# Patient Record
Sex: FEMALE | Race: WHITE | Employment: FULL TIME | URBAN - METROPOLITAN AREA
[De-identification: names, ages, dates, MRNs, and addresses within clinical notes are randomized per-mention and may not be internally consistent; named-entity substitution may affect disease eponyms.]

---

## 2017-06-06 ENCOUNTER — OFFICE VISIT (OUTPATIENT)
Dept: INTERNAL MEDICINE CLINIC | Age: 37
End: 2017-06-06

## 2017-06-06 VITALS
WEIGHT: 148 LBS | RESPIRATION RATE: 16 BRPM | BODY MASS INDEX: 25.27 KG/M2 | HEIGHT: 64 IN | TEMPERATURE: 97.3 F | HEART RATE: 71 BPM | SYSTOLIC BLOOD PRESSURE: 123 MMHG | DIASTOLIC BLOOD PRESSURE: 75 MMHG

## 2017-06-06 DIAGNOSIS — J01.00 SUBACUTE MAXILLARY SINUSITIS: Primary | ICD-10-CM

## 2017-06-06 RX ORDER — CODEINE PHOSPHATE AND GUAIFENESIN 10; 100 MG/5ML; MG/5ML
5 SOLUTION ORAL
Qty: 180 ML | Refills: 0 | Status: SHIPPED | OUTPATIENT
Start: 2017-06-06 | End: 2021-12-14 | Stop reason: ALTCHOICE

## 2017-06-06 RX ORDER — AZITHROMYCIN 250 MG/1
TABLET, FILM COATED ORAL
Qty: 6 TAB | Refills: 0 | Status: SHIPPED | OUTPATIENT
Start: 2017-06-06 | End: 2017-06-11

## 2017-06-06 NOTE — MR AVS SNAPSHOT
Visit Information Date & Time Provider Department Dept. Phone Encounter #  
 6/6/2017  7:45 AM Michelle Shaw MD [facility] and [facility] [phone] [id number] Follow-up Instructions Return if symptoms worsen or fail to improve. Follow-up and Disposition History Upcoming Health Maintenance Date Due DTaP/Tdap/Td series (1 - Tdap) 1/18/2002 PAP AKA CERVICAL CYTOLOGY 7/8/2017 INFLUENZA AGE 9 TO ADULT 8/1/2017 Allergies as of 6/6/2017  Review Complete On: 6/6/2017 By: Michelle Shaw MD  
 No Known Allergies Current Immunizations  Never Reviewed No immunizations on file. Not reviewed this visit You Were Diagnosed With   
  
 Codes Comments Subacute maxillary sinusitis    -  Primary ICD-10-CM: J01.00 ICD-9-CM: 461.0 Vitals BP Pulse Temp Resp Height(growth percentile) Weight(growth percentile) 123/75 71 97.3 °F (36.3 °C) 16 5' 4\" (1.626 m) 148 lb (67.1 kg) BMI Smoking Status 25.4 kg/m2 Never Smoker BMI and BSA Data Body Mass Index Body Surface Area  
 25.4 kg/m 2 1.74 m 2 Your Updated Medication List  
  
   
This list is accurate as of: 6/6/17 12:35 PM.  Always use your most recent med list.  
  
  
  
  
 azithromycin 250 mg tablet Commonly known as:  Alcus Trinidad Take 2 tablets today, then take 1 tablet daily EMOQUETTE 0.15-0.03 mg Tab Generic drug:  desogestrel-ethinyl estradiol Take 1 Tab by mouth daily. guaiFENesin-codeine 100-10 mg/5 mL solution Commonly known as:  ROBITUSSIN AC Take 5 mL by mouth three (3) times daily as needed for Cough. Max Daily Amount: 15 mL. ZyrTEC 10 mg Cap Generic drug:  Cetirizine Take  by mouth. Prescriptions Printed Refills  
 azithromycin (ZITHROMAX) 250 mg tablet 0 Sig: Take 2 tablets today, then take 1 tablet daily Class: Print guaiFENesin-codeine (ROBITUSSIN AC) 100-10 mg/5 mL solution 0 Sig: Take 5 mL by mouth three (3) times daily as needed for Cough. Max Daily Amount: 15 mL. Class: Print Route: Oral  
  
Follow-up Instructions Return if symptoms worsen or fail to improve. Patient Instructions Adults: For nasal congestion, cough and cold/flu symptoms I advised: - Seek medical care if symptoms become more severe or if you develop  
   chest pain, shortness of breath, confusion. 
  - Contact us if your symptoms fail to improve after 7-10 days - Rest as much as possible and stay home from work/school at least 24 hours  
               after last fever - Wash hand frequently and cough/sneeze into your sleeve to help prevent  
    infection of others - Drink plenty of fluids - Ibuprofen (Advil, Motrin) 400-800mg every 6 hours or Aleve 220 mg 1-2 pills every 8 hours for fever, headache, pain - Tylenol extra strength 500 mg every 6 hours for pain, headache, fever 
 - Nasal saline rinses 2-3 times daily for nasal congestion - Mucinex 1200 mg twice daily or Guaifenesin 400 mg every 4 hours for chest  
    congestion - Robitussin DM or Delsym for cough(suppress cough and thin mucus. ) 
 - Cepacol throat lozenges and saline gargles (1 tsp salt in 8 oz water) for sore  
    throat - Tea with honey for cough (buckwheat honey preferred) - Benadryl (diphenhydramine) 50 mg at night for nasal congestion/allergies - Pseudoephedrine 12-hour tablets twice daily for nasal and inner ear    
  -Ask your pharmacist (this is kept behind the counter) -If you have high blood pressure or heart disease, use this    
   medication with caution (ask your doctor), alternative coricidin - Afrin (oxymetazoline) nasal spray 2 sprays in each nostril twice daily for severe  
   congestion.   
   -Do not use this medication for more than 3 days as it may cause \"rebound congestion\". -If you have high blood pressure or heart disease, use this medication  
    with caution (ask your doctor) Introducing [facility] & HEALTH SERVICES! Brice Apple introduces SomaLogic patient portal. Now you can access parts of your medical record, email your doctor's office, and request medication refills online. 1. In your internet browser, go to [email or web address] 2. Click on the First Time User? Click Here link in the Sign In box. You will see the New Member Sign Up page. 3. Enter your SomaLogic Access Code exactly as it appears below. You will not need to use this code after youve completed the sign-up process. If you do not sign up before the expiration date, you must request a new code. · SomaLogic Access Code: [email or web address] Expires: 9/4/2017 12:35 PM 
 
4. Enter the last four digits of your Social Security Number (xxxx) and Date of Birth (mm/dd/yyyy) as indicated and click Submit. You will be taken to the next sign-up page. 5. Create a SomaLogic ID. This will be your SomaLogic login ID and cannot be changed, so think of one that is secure and easy to remember. 6. Create a SomaLogic password. You can change your password at any time. 7. Enter your Password Reset Question and Answer. This can be used at a later time if you forget your password. 8. Enter your e-mail address. You will receive e-mail notification when new information is available in [address]. 9. Click Sign Up. You can now view and download portions of your medical record. 10. Click the Download Summary menu link to download a portable copy of your medical information. If you have questions, please visit the Frequently Asked Questions section of the SomaLogic website. Remember, SomaLogic is NOT to be used for urgent needs. For medical emergencies, dial 911. Now available from your iPhone and Android! Please provide this summary of care documentation to your next provider. Your primary care clinician is listed as S[place]. If you have any questions after today's visit, please call [phone].

## 2017-06-06 NOTE — PATIENT INSTRUCTIONS
Adults: For nasal congestion, cough and cold/flu symptoms I advised:    - Seek medical care if symptoms become more severe or if you develop      chest pain, shortness of breath, confusion.    - Contact us if your symptoms fail to improve after 7-10 days   - Rest as much as possible and stay home from work/school at least 24 hours                  after last fever              - Wash hand frequently and cough/sneeze into your sleeve to help prevent       infection of others   - Drink plenty of fluids   - Ibuprofen (Advil, Motrin) 400-800mg every 6 hours or                  Aleve 220 mg 1-2 pills every 8 hours for fever, headache, pain   - Tylenol extra strength 500 mg every 6 hours for pain, headache, fever   - Nasal saline rinses 2-3 times daily for nasal congestion   - Mucinex 1200 mg twice daily or Guaifenesin 400 mg every 4 hours for chest       congestion              - Robitussin DM or Delsym for cough(suppress cough and thin mucus. )   - Cepacol throat lozenges and saline gargles (1 tsp salt in 8 oz water) for sore       throat   - Tea with honey for cough (buckwheat honey preferred)              - Benadryl (diphenhydramine) 50 mg at night for nasal congestion/allergies   - Pseudoephedrine 12-hour tablets twice daily for nasal and inner ear       -Ask your pharmacist (this is kept behind the counter)     -If you have high blood pressure or heart disease, use this        medication with caution (ask your doctor), alternative coricidin   - Afrin (oxymetazoline) nasal spray 2 sprays in each nostril twice daily for severe      congestion.       -Do not use this medication for more than 3 days as it may cause         \"rebound congestion\".        -If you have high blood pressure or heart disease, use this medication       with caution (ask your doctor)

## 2017-06-06 NOTE — PROGRESS NOTES
No chief complaint on file. she is a 40y.o. year old female who presents for evaluation of cough  congestion, post nasal drip and productive cough for 1 months. No fevers. no nausea and no vomiting . No pain while swallowing. Over-the-counter remedies including none   . Hx Asthma:  no  Smoker:  no  Contacts with similar infections: yes   Recent travel:no         Reviewed and agree with Nurse Note and duplicated in this note. Reviewed PmHx, RxHx, FmHx, SocHx, AllgHx and updated and dated in the chart. No family history on file. No past medical history on file. Social History     Social History    Marital status:      Spouse name: N/A    Number of children: N/A    Years of education: N/A     Social History Main Topics    Smoking status: Never Smoker    Smokeless tobacco: Never Used    Alcohol use Not on file    Drug use: Not on file    Sexual activity: Not on file     Other Topics Concern    Not on file     Social History Narrative    No narrative on file        Review of Systems - negative except as listed above      Objective: There were no vitals filed for this visit.     Physical Examination: General appearance - alert, well appearing, and in no distress  Eyes - pupils equal and reactive, extraocular eye movements intact  Ears - bilateral TM's and external ear canals normal  Nose - normal and patent, no erythema, discharge or polyps  Mouth - mucous membranes moist, pharynx normal without lesions  Neck - supple, no significant adenopathy  Chest - clear to auscultation, no wheezes, rales or rhonchi, symmetric air entry  Heart - normal rate, regular rhythm, normal S1, S2, no murmurs, rubs, clicks or gallops  Abdomen - soft, nontender, nondistended, no masses or organomegaly  Extremities - peripheral pulses normal, no pedal edema, no clubbing or cyanosis  Skin - normal coloration and turgor, no rashes, no suspicious skin lesions noted    Assessment/ Plan:   There are no diagnoses linked to this encounter. Follow-up Disposition: Not on File  Adults: For nasal congestion, cough and cold/flu symptoms I advised:    - Seek medical care if symptoms become more severe or if you develop      chest pain, shortness of breath, confusion.    - Contact us if your symptoms fail to improve after 7-10 days   - Rest as much as possible and stay home from work/school at least 24 hours                  after last fever              - Wash hand frequently and cough/sneeze into your sleeve to help prevent       infection of others   - Drink plenty of fluids   - Ibuprofen (Advil, Motrin) 400-800mg every 6 hours or                  Aleve 220 mg 1-2 pills every 8 hours for fever, headache, pain   - Tylenol extra strength 500 mg every 6 hours for pain, headache, fever   - Nasal saline rinses 2-3 times daily for nasal congestion   - Mucinex 1200 mg twice daily or Guaifenesin 400 mg every 4 hours for chest       congestion              - Robitussin DM or Delsym for cough(suppress cough and thin mucus. )   - Cepacol throat lozenges and saline gargles (1 tsp salt in 8 oz water) for sore       throat   - Tea with honey for cough (buckwheat honey preferred)              - Benadryl (diphenhydramine) 50 mg at night for nasal congestion/allergies   - Pseudoephedrine 12-hour tablets twice daily for nasal and inner ear       -Ask your pharmacist (this is kept behind the counter)     -If you have high blood pressure or heart disease, use this        medication with caution (ask your doctor), alternative coricidin   - Afrin (oxymetazoline) nasal spray 2 sprays in each nostril twice daily for severe      congestion.       -Do not use this medication for more than 3 days as it may cause         \"rebound congestion\".        -If you have high blood pressure or heart disease, use this medication       with caution (ask your doctor)         I have discussed the diagnosis with the patient and the intended plan as seen in the above orders. The patient has received an after-visit summary and questions were answered concerning future plans. Medication Side Effects and Warnings were discussed with patient: yes  Patient Labs were reviewed and or requested: yes  Patient Past Records were reviewed and or requested  yes  I have discussed the diagnosis with the patient and the intended plan as seen in the above orders. The patient has received an after-visit summary and questions were answered concerning future plans. Pt agrees to call or return to clinic and/or go to closest ER with any worsening of symptoms. This may include, but not limited to increased fever (>100.4) with NSAIDS or Tylenol, increased edema, confusion, rash, worsening of presenting symptoms. Patient was informed/counseled to:    1) Remember to stay active and/or exercise regularly (I suggest 30-45 minutes daily)   2) For reliable dietary information, go to www. EATRIGHT.org. You may wish to consider seeing the nutritionist at [facility] at #[phone] or [phone], also consider the [address]   3) I routinely suggest a complete physical exam once each year (your birth month)

## 2017-12-13 ENCOUNTER — OFFICE VISIT (OUTPATIENT)
Dept: INTERNAL MEDICINE CLINIC | Age: 37
End: 2017-12-13

## 2017-12-13 VITALS
RESPIRATION RATE: 16 BRPM | SYSTOLIC BLOOD PRESSURE: 123 MMHG | OXYGEN SATURATION: 100 % | HEART RATE: 80 BPM | DIASTOLIC BLOOD PRESSURE: 71 MMHG | HEIGHT: 64 IN | TEMPERATURE: 97.8 F | BODY MASS INDEX: 26.92 KG/M2 | WEIGHT: 157.7 LBS

## 2017-12-13 DIAGNOSIS — J06.9 VIRAL UPPER RESPIRATORY TRACT INFECTION: Primary | ICD-10-CM

## 2017-12-13 RX ORDER — AZITHROMYCIN 250 MG/1
TABLET, FILM COATED ORAL
Qty: 6 TAB | Refills: 0 | Status: SHIPPED | OUTPATIENT
Start: 2017-12-13 | End: 2017-12-18

## 2017-12-13 RX ORDER — CODEINE PHOSPHATE AND GUAIFENESIN 10; 100 MG/5ML; MG/5ML
5 SOLUTION ORAL
Qty: 180 ML | Refills: 0 | Status: SHIPPED | OUTPATIENT
Start: 2017-12-13 | End: 2021-12-14 | Stop reason: ALTCHOICE

## 2017-12-13 NOTE — MR AVS SNAPSHOT
Visit Information Date & Time Provider Department Dept. Phone Encounter #  
 12/13/2017 10:45 AM Julito Villagran MD [facility] Sports Medicine and [place] [address] [id number] Follow-up Instructions Return if symptoms worsen or fail to improve. Follow-up and Disposition History Upcoming Health Maintenance Date Due DTaP/Tdap/Td series (1 - Tdap) 1/18/2002 PAP AKA CERVICAL CYTOLOGY 7/8/2017 Allergies as of 12/13/2017  Review Complete On: 12/13/2017 By: Hosea Valera LPN No Known Allergies Current Immunizations  Never Reviewed Name Date Influenza Vaccine 10/5/2017 Not reviewed this visit You Were Diagnosed With   
  
 Codes Comments Viral upper respiratory tract infection    -  Primary ICD-10-CM: J06.9, B97.89 ICD-9-CM: 465.9 Vitals BP Pulse Temp Resp Height(growth percentile) Weight(growth percentile) 123/71 (BP 1 Location: Left arm, BP Patient Position: Sitting) 80 97.8 °F (36.6 °C) (Oral) 16 5' 4\" (1.626 m) 157 lb 11.2 oz (71.5 kg) LMP SpO2 BMI OB Status Smoking Status (LMP Unknown) 100% 27.07 kg/m2 Having regular periods Never Smoker Vitals History BMI and BSA Data Body Mass Index Body Surface Area  
 27.07 kg/m 2 1.8 m 2 Your Updated Medication List  
  
   
This list is accurate as of: 12/13/17 11:03 AM.  Always use your most recent med list.  
  
  
  
  
 azithromycin 250 mg tablet Commonly known as:  Jesus Grace Take 2 tablets today, then take 1 tablet daily EMOQUETTE 0.15-0.03 mg Tab Generic drug:  desogestrel-ethinyl estradiol Take 1 Tab by mouth daily. * guaiFENesin-codeine 100-10 mg/5 mL solution Commonly known as:  ROBITUSSIN AC Take 5 mL by mouth three (3) times daily as needed for Cough. Max Daily Amount: 15 mL. * guaiFENesin-codeine 100-10 mg/5 mL solution Commonly known as:  ROBITUSSIN AC  
 Take 5 mL by mouth three (3) times daily as needed for Cough. Max Daily Amount: 15 mL. ZyrTEC 10 mg Cap Generic drug:  Cetirizine Take  by mouth. * Notice: This list has 2 medication(s) that are the same as other medications prescribed for you. Read the directions carefully, and ask your doctor or other care provider to review them with you. Prescriptions Printed Refills  
 guaiFENesin-codeine (ROBITUSSIN AC) 100-10 mg/5 mL solution 0 Sig: Take 5 mL by mouth three (3) times daily as needed for Cough. Max Daily Amount: 15 mL. Class: Print Route: Oral  
 azithromycin (ZITHROMAX) 250 mg tablet 0 Sig: Take 2 tablets today, then take 1 tablet daily Class: Print Follow-up Instructions Return if symptoms worsen or fail to improve. Patient Instructions Upper Respiratory Infection (Cold): Care Instructions Your Care Instructions An upper respiratory infection, or URI, is an infection of the nose, sinuses, or throat. URIs are spread by coughs, sneezes, and direct contact. The common cold is the most frequent kind of URI. The flu and sinus infections are other kinds of URIs. Almost all URIs are caused by viruses. Antibiotics won't cure them. But you can treat most infections with home care. This may include drinking lots of fluids and taking over-the-counter pain medicine. You will probably feel better in 4 to 10 days. The doctor has checked you carefully, but problems can develop later. If you notice any problems or new symptoms, get medical treatment right away. Follow-up care is a key part of your treatment and safety. Be sure to make and go to all appointments, and call your doctor if you are having problems. It's also a good idea to know your test results and keep a list of the medicines you take. How can you care for yourself at home?  
· To prevent dehydration, drink plenty of fluids, enough so that your urine is light yellow or clear like water. Choose water and other caffeine-free clear liquids until you feel better. If you have kidney, heart, or liver disease and have to limit fluids, talk with your doctor before you increase the amount of fluids you drink. · Take an over-the-counter pain medicine, such as acetaminophen (Tylenol), ibuprofen (Advil, Motrin), or naproxen (Aleve). Read and follow all instructions on the label. · Before you use cough and cold medicines, check the label. These medicines may not be safe for young children or for people with certain health problems. · Be careful when taking over-the-counter cold or flu medicines and Tylenol at the same time. Many of these medicines have acetaminophen, which is Tylenol. Read the labels to make sure that you are not taking more than the recommended dose. Too much acetaminophen (Tylenol) can be harmful. · Get plenty of rest. 
· Do not smoke or allow others to smoke around you. If you need help quitting, talk to your doctor about stop-smoking programs and medicines. These can increase your chances of quitting for good. When should you call for help? Call 911 anytime you think you may need emergency care. For example, call if: 
? · You have severe trouble breathing. ?Call your doctor now or seek immediate medical care if: 
? · You seem to be getting much sicker. ? · You have new or worse trouble breathing. ? · You have a new or higher fever. ? · You have a new rash. ? Watch closely for changes in your health, and be sure to contact your doctor if: 
? · You have a new symptom, such as a sore throat, an earache, or sinus pain. ? · You cough more deeply or more often, especially if you notice more mucus or a change in the color of your mucus. ? · You do not get better as expected. Where can you learn more? Go to http://david-chito.info/.  
Enter X018 in the search box to learn more about \"Upper Respiratory Infection (Cold): Care Instructions. \" Current as of: May 12, 2017 Content Version: 11.4 © 9029-3146 [employer]. Care instructions adapted under license by [employer] (which disclaims liability or warranty for this information). If you have questions about a medical condition or this instruction, always ask your healthcare professional. [address] any warranty or liability for your use of this information. Introducing [facility] & HEALTH SERVICES! Farooq Russell introduces [employer] patient portal. Now you can access parts of your medical record, email your doctor's office, and request medication refills online. 1. In your internet browser, go to [email or web address] 2. Click on the First Time User? Click Here link in the Sign In box. You will see the New Member Sign Up page. 3. Enter your [employer] Access Code exactly as it appears below. You will not need to use this code after youve completed the sign-up process. If you do not sign up before the expiration date, you must request a new code. · [employer] Access Code: [email or web address] Expires: 3/13/2018 11:03 AM 
 
4. Enter the last four digits of your Social Security Number (xxxx) and Date of Birth (mm/dd/yyyy) as indicated and click Submit. You will be taken to the next sign-up page. 5. Create a [employer] ID. This will be your [employer] login ID and cannot be changed, so think of one that is secure and easy to remember. 6. Create a [employer] password. You can change your password at any time. 7. Enter your Password Reset Question and Answer. This can be used at a later time if you forget your password. 8. Enter your e-mail address. You will receive e-mail notification when new information is available in [address] 9. Click Sign Up. You can now view and download portions of your medical record. 10. Click the Download Summary menu link to download a portable copy of your medical information. If you have questions, please visit the Frequently Asked Questions section of the [employer] website. Remember, One[employer] is NOT to be used for urgent needs. For medical emergencies, dial 911. Now available from your iPhone and Android! Please provide this summary of care documentation to your next provider. Your primary care clinician is listed as Jack Musa. If you have any questions after today's visit, please call [phone].

## 2017-12-13 NOTE — PROGRESS NOTES
Chief Complaint   Patient presents with    Cold Symptoms     she is a 40y.o. year old female who presents for evaluation of cold symptoms  congestion and productive cough for several weeks. No fever. no nausea and no vomiting. Over-the-counter remedies including mucinex. Hx Asthma:  no  Smoker:  no  Contacts with similar infections: no   Recent travel:no   Sputum Description: scant and yellow      Reviewed and agree with Nurse Note and duplicated in this note. Reviewed PmHx, RxHx, FmHx, SocHx, AllgHx and updated and dated in the chart. No family history on file. No past medical history on file.    Social History     Social History    Marital status:      Spouse name: N/A    Number of children: N/A    Years of education: N/A     Social History Main Topics    Smoking status: Never Smoker    Smokeless tobacco: Never Used    Alcohol use None    Drug use: None    Sexual activity: Not Asked     Other Topics Concern    None     Social History Narrative        Review of Systems - negative except as listed above      Objective:     Vitals:    12/13/17 1049   BP: 123/71   Pulse: 80   Resp: 16   Temp: 97.8 °F (36.6 °C)   TempSrc: Oral   SpO2: 100%   Weight: 157 lb 11.2 oz (71.5 kg)   Height: 5' 4\" (1.626 m)       Physical Examination: General appearance - alert, well appearing, and in no distress  Eyes - pupils equal and reactive, extraocular eye movements intact  Ears - bilateral TM's and external ear canals normal  Nose - mucosal congestion and mucosal erythema  Mouth - mucous membranes moist, pharynx normal without lesions  Neck - supple, no significant adenopathy  Chest - clear to auscultation, no wheezes, rales or rhonchi, symmetric air entry  Heart - normal rate, regular rhythm, normal S1, S2, no murmurs, rubs, clicks or gallops  Abdomen - soft, nontender, nondistended, no masses or organomegaly  Neurological - alert, oriented, normal speech, no focal findings or movement disorder noted  Musculoskeletal - no joint tenderness, deformity or swelling  Extremities - peripheral pulses normal, no pedal edema, no clubbing or cyanosis  Skin - normal coloration and turgor, no rashes, no suspicious skin lesions noted    Assessment/ Plan:   Diagnoses and all orders for this visit:    1. Viral upper respiratory tract infection    Other orders  -     guaiFENesin-codeine (ROBITUSSIN AC) 100-10 mg/5 mL solution; Take 5 mL by mouth three (3) times daily as needed for Cough. Max Daily Amount: 15 mL.  -     azithromycin (ZITHROMAX) 250 mg tablet; Take 2 tablets today, then take 1 tablet daily     Follow-up Disposition:  Return if symptoms worsen or fail to improve. Adults: For nasal congestion, cough and cold/flu symptoms I advised:    - Seek medical care if symptoms become more severe or if you develop      chest pain, shortness of breath, confusion.    - Contact us if your symptoms fail to improve after 7-10 days   - Rest as much as possible and stay home from work/school at least 24 hours                  after last fever              - Wash hand frequently and cough/sneeze into your sleeve to help prevent       infection of others   - Drink plenty of fluids   - Ibuprofen (Advil, Motrin) 400-800mg every 6 hours or                  Aleve 220 mg 1-2 pills every 8 hours for fever, headache, pain   - Tylenol extra strength 500 mg every 6 hours for pain, headache, fever   - Nasal saline rinses 2-3 times daily for nasal congestion   - Mucinex 1200 mg twice daily or Guaifenesin 400 mg every 4 hours for chest       congestion              - Robitussin DM or Delsym for cough(suppress cough and thin mucus.  )   - Cepacol throat lozenges and saline gargles (1 tsp salt in 8 oz water) for sore       throat   - Tea with honey for cough (buckwheat honey preferred)              - Benadryl (diphenhydramine) 50 mg at night for nasal congestion/allergies   - Pseudoephedrine 12-hour tablets twice daily for nasal and inner ear -Ask your pharmacist (this is kept behind the counter)     -If you have high blood pressure or heart disease, use this        medication with caution (ask your doctor), alternative coricidin   - Afrin (oxymetazoline) nasal spray 2 sprays in each nostril twice daily for severe      congestion.       -Do not use this medication for more than 3 days as it may cause         \"rebound congestion\". -If you have high blood pressure or heart disease, use this medication       with caution (ask your doctor)           1) Remember to stay active and/or exercise regularly (I suggest 30-45 minutes daily)   2) For reliable dietary information, go to www. EATRIGHT.org. You may wish to consider seeing the nutritionist at [facility] [phone], also consider the [address] I have discussed the diagnosis with the patient and the intended plan as seen in the above orders. The patient has received an after-visit summary and questions were answered concerning future plans. Medication Side Effects and Warnings were discussed with patient: yes  Patient Labs were reviewed and or requested: yes  Patient Past Records were reviewed and or requested  yes  I have discussed the diagnosis with the patient and the intended plan as seen in the above orders. The patient has received an after-visit summary and questions were answered concerning future plans. Pt agrees to call or return to clinic and/or go to closest ER with any worsening of symptoms. This may include, but not limited to increased fever (>100.4) with NSAIDS or Tylenol, increased edema, confusion, rash, worsening of presenting symptoms.

## 2017-12-13 NOTE — PATIENT INSTRUCTIONS
Upper Respiratory Infection (Cold): Care Instructions  Your Care Instructions    An upper respiratory infection, or URI, is an infection of the nose, sinuses, or throat. URIs are spread by coughs, sneezes, and direct contact. The common cold is the most frequent kind of URI. The flu and sinus infections are other kinds of URIs. Almost all URIs are caused by viruses. Antibiotics won't cure them. But you can treat most infections with home care. This may include drinking lots of fluids and taking over-the-counter pain medicine. You will probably feel better in 4 to 10 days. The doctor has checked you carefully, but problems can develop later. If you notice any problems or new symptoms, get medical treatment right away. Follow-up care is a key part of your treatment and safety. Be sure to make and go to all appointments, and call your doctor if you are having problems. It's also a good idea to know your test results and keep a list of the medicines you take. How can you care for yourself at home? · To prevent dehydration, drink plenty of fluids, enough so that your urine is light yellow or clear like water. Choose water and other caffeine-free clear liquids until you feel better. If you have kidney, heart, or liver disease and have to limit fluids, talk with your doctor before you increase the amount of fluids you drink. · Take an over-the-counter pain medicine, such as acetaminophen (Tylenol), ibuprofen (Advil, Motrin), or naproxen (Aleve). Read and follow all instructions on the label. · Before you use cough and cold medicines, check the label. These medicines may not be safe for young children or for people with certain health problems. · Be careful when taking over-the-counter cold or flu medicines and Tylenol at the same time. Many of these medicines have acetaminophen, which is Tylenol. Read the labels to make sure that you are not taking more than the recommended dose.  Too much acetaminophen (Tylenol) can be harmful. · Get plenty of rest.  · Do not smoke or allow others to smoke around you. If you need help quitting, talk to your doctor about stop-smoking programs and medicines. These can increase your chances of quitting for good. When should you call for help? Call 911 anytime you think you may need emergency care. For example, call if:  ? · You have severe trouble breathing. ?Call your doctor now or seek immediate medical care if:  ? · You seem to be getting much sicker. ? · You have new or worse trouble breathing. ? · You have a new or higher fever. ? · You have a new rash. ? Watch closely for changes in your health, and be sure to contact your doctor if:  ? · You have a new symptom, such as a sore throat, an earache, or sinus pain. ? · You cough more deeply or more often, especially if you notice more mucus or a change in the color of your mucus. ? · You do not get better as expected. Where can you learn more? Go to http://david-chito.info/. Enter W757 in the search box to learn more about \"Upper Respiratory Infection (Cold): Care Instructions. \"  Current as of: May 12, 2017  Content Version: 11.4  © 8934-2686 [employer]wise[employer]. Care instructions adapted under license by [employer] (which disclaims liability or warranty for this information). If you have questions about a medical condition or this instruction, always ask your healthcare professional. [place] [address] any warranty or liability for your use of this information.

## 2018-01-04 ENCOUNTER — OFFICE VISIT (OUTPATIENT)
Dept: INTERNAL MEDICINE CLINIC | Age: 38
End: 2018-01-04

## 2018-01-04 VITALS
BODY MASS INDEX: 26.63 KG/M2 | DIASTOLIC BLOOD PRESSURE: 81 MMHG | WEIGHT: 156 LBS | HEART RATE: 49 BPM | SYSTOLIC BLOOD PRESSURE: 125 MMHG | HEIGHT: 64 IN | TEMPERATURE: 97.3 F | OXYGEN SATURATION: 97 % | RESPIRATION RATE: 16 BRPM

## 2018-01-04 DIAGNOSIS — J06.9 VIRAL UPPER RESPIRATORY TRACT INFECTION: Primary | ICD-10-CM

## 2018-01-04 RX ORDER — LEVOFLOXACIN 500 MG/1
500 TABLET, FILM COATED ORAL DAILY
Qty: 7 TAB | Refills: 0 | Status: SHIPPED | OUTPATIENT
Start: 2018-01-04 | End: 2018-01-11

## 2018-01-04 NOTE — PROGRESS NOTES
Chief Complaint   Patient presents with    Cold Symptoms     she is a 40y.o. year old female who presents for follow-up of URI  congestion and productive cough for 3 weeks. no nausea and no vomiting . No sore throat and fever. Over-the-counter remedies including None. Hx Asthma:  no  Smoker:  no  Contacts with similar infections: yes   Recent travel:no   Sputum Description: scant and green      Reviewed and agree with Nurse Note and duplicated in this note. Reviewed PmHx, RxHx, FmHx, SocHx, AllgHx and updated and dated in the chart. No family history on file. No past medical history on file.    Social History     Social History    Marital status:      Spouse name: N/A    Number of children: N/A    Years of education: N/A     Social History Main Topics    Smoking status: Never Smoker    Smokeless tobacco: Never Used    Alcohol use None    Drug use: None    Sexual activity: Not Asked     Other Topics Concern    None     Social History Narrative        Review of Systems - negative except as listed above      Objective:     Vitals:    01/04/18 1245   BP: 125/81   Pulse: (!) 49   Resp: 16   Temp: 97.3 °F (36.3 °C)   TempSrc: Oral   SpO2: 97%   Weight: 156 lb (70.8 kg)   Height: 5' 4\" (1.626 m)       Physical Examination: General appearance - alert, well appearing, and in no distress  Eyes - pupils equal and reactive, extraocular eye movements intact  Ears - bilateral TM's and external ear canals normal  Nose - normal and patent, no erythema, discharge or polyps  Mouth - mucous membranes moist, pharynx normal without lesions  Neck - supple, no significant adenopathy  Chest - clear to auscultation, no wheezes, rales or rhonchi, symmetric air entry  Heart - normal rate, regular rhythm, normal S1, S2, no murmurs, rubs, clicks or gallops  Abdomen - soft, nontender, nondistended, no masses or organomegaly  Extremities - peripheral pulses normal, no pedal edema, no clubbing or cyanosis  Skin - normal coloration and turgor, no rashes, no suspicious skin lesions noted    Assessment/ Plan:   Diagnoses and all orders for this visit:    1. Viral upper respiratory tract infection    Other orders  -     levoFLOXacin (LEVAQUIN) 500 mg tablet; Take 1 Tab by mouth daily for 7 days. Follow-up Disposition: Not on File  Adults: For nasal congestion, cough and cold/flu symptoms I advised:    - Seek medical care if symptoms become more severe or if you develop      chest pain, shortness of breath, confusion.    - Contact us if your symptoms fail to improve after 7-10 days   - Rest as much as possible and stay home from work/school at least 24 hours                  after last fever              - Wash hand frequently and cough/sneeze into your sleeve to help prevent       infection of others   - Drink plenty of fluids   - Ibuprofen (Advil, Motrin) 400-800mg every 6 hours or                  Aleve 220 mg 1-2 pills every 8 hours for fever, headache, pain   - Tylenol extra strength 500 mg every 6 hours for pain, headache, fever   - Nasal saline rinses 2-3 times daily for nasal congestion   - Mucinex 1200 mg twice daily or Guaifenesin 400 mg every 4 hours for chest       congestion              - Robitussin DM or Delsym for cough(suppress cough and thin mucus.  )   - Cepacol throat lozenges and saline gargles (1 tsp salt in 8 oz water) for sore       throat   - Tea with honey for cough (buckwheat honey preferred)              - Benadryl (diphenhydramine) 50 mg at night for nasal congestion/allergies   - Pseudoephedrine 12-hour tablets twice daily for nasal and inner ear       -Ask your pharmacist (this is kept behind the counter)     -If you have high blood pressure or heart disease, use this        medication with caution (ask your doctor), alternative coricidin   - Afrin (oxymetazoline) nasal spray 2 sprays in each nostril twice daily for severe      congestion.       -Do not use this medication for more than 3 days as it may cause         \"rebound congestion\". -If you have high blood pressure or heart disease, use this medication       with caution (ask your doctor)        1) Remember to stay active and/or exercise regularly (I suggest 30-45 minutes daily)   2) For reliable dietary information, go to www. EATRIGHT.org. You may wish to consider seeing the nutritionist at [facility] [phone], also consider the [address] I have discussed the diagnosis with the patient and the intended plan as seen in the above orders. The patient has received an after-visit summary and questions were answered concerning future plans. Medication Side Effects and Warnings were discussed with patient: yes  Patient Labs were reviewed and or requested: yes  Patient Past Records were reviewed and or requested  yes  I have discussed the diagnosis with the patient and the intended plan as seen in the above orders. The patient has received an after-visit summary and questions were answered concerning future plans. Pt agrees to call or return to clinic and/or go to closest ER with any worsening of symptoms. This may include, but not limited to increased fever (>100.4) with NSAIDS or Tylenol, increased edema, confusion, rash, worsening of presenting symptoms.

## 2018-01-04 NOTE — MR AVS SNAPSHOT
Visit Information Date & Time Provider Department Dept. Phone Encounter #  
 1/4/2018  1:15 PM George Coello MD [facility] [id number] Your Appointments 1/4/2018  1:15 PM  
Any with George Coello MD  
[address] [place] [address] and Primary Care [facility] the [facility]) Appt Note: FOLLOW UP  
 [address] [place]  
  
   
 [id number] Upcoming Health Maintenance Date Due DTaP/Tdap/Td series (1 - Tdap) 1/18/2002 PAP AKA CERVICAL CYTOLOGY 7/8/2017 Allergies as of 1/4/2018  Review Complete On: 1/4/2018 By: Lahoma Claude, LPN No Known Allergies Current Immunizations  Never Reviewed Name Date Influenza Vaccine 10/5/2017 Not reviewed this visit You Were Diagnosed With   
  
 Codes Comments Viral upper respiratory tract infection    -  Primary ICD-10-CM: J06.9, B97.89 ICD-9-CM: 465.9 Vitals BP Pulse Temp Resp Height(growth percentile) Weight(growth percentile) 125/81 (BP 1 Location: Left arm, BP Patient Position: Sitting) (!) 49 97.3 °F (36.3 °C) (Oral) 16 5' 4\" (1.626 m) 156 lb (70.8 kg) LMP SpO2 BMI OB Status Smoking Status (LMP Unknown) 97% 26.78 kg/m2 Having regular periods Never Smoker Vitals History BMI and BSA Data Body Mass Index Body Surface Area  
 26.78 kg/m 2 1.79 m 2 Preferred Pharmacy Pharmacy Name Phone [address], [address] [place] [address]  [phone] Your Updated Medication List  
  
   
This list is accurate as of: 1/4/18 12:55 PM.  Always use your most recent med list.  
  
  
  
  
 EMOQUETTE 0.15-0.03 mg Tab Generic drug:  desogestrel-ethinyl estradiol Take 1 Tab by mouth daily. * guaiFENesin-codeine 100-10 mg/5 mL solution Commonly known as:  ROBITUSSIN AC Take 5 mL by mouth three (3) times daily as needed for Cough.  Max Daily Amount: 15 mL. * guaiFENesin-codeine 100-10 mg/5 mL solution Commonly known as:  ROBITUSSIN AC Take 5 mL by mouth three (3) times daily as needed for Cough. Max Daily Amount: 15 mL. levoFLOXacin 500 mg tablet Commonly known as:  Brett Derek Take 1 Tab by mouth daily for 7 days. ZyrTEC 10 mg Cap Generic drug:  Cetirizine Take  by mouth. * Notice: This list has 2 medication(s) that are the same as other medications prescribed for you. Read the directions carefully, and ask your doctor or other care provider to review them with you. Prescriptions Sent to Pharmacy Refills  
 levoFLOXacin (LEVAQUIN) 500 mg tablet 0 Sig: Take 1 Tab by mouth daily for 7 days. Class: Normal  
 Pharmacy: [facility] DR POORNIMA HODGSON [address] [facility],[address], [facility] [address]  #: [phone] Route: Oral  
  
Introducing [facility]! Julia Rogers introduces [employer] patient portal. Now you can access parts of your medical record, email your doctor's office, and request medication refills online. 1. In your internet browser, go to [email or web address] 2. Click on the First Time User? Click Here link in the Sign In box. You will see the New Member Sign Up page. 3. Enter your [employer] Access Code exactly as it appears below. You will not need to use this code after youve completed the sign-up process. If you do not sign up before the expiration date, you must request a new code. · [employer] Access Code: [email or web address] Expires: 3/13/2018 11:03 AM 
 
4. Enter the last four digits of your Social Security Number (xxxx) and Date of Birth (mm/dd/yyyy) as indicated and click Submit. You will be taken to the next sign-up page. 5. Create a S[employer]N Airo[employer]ot ID. This will be your S[employer] A[employer]ot login ID and cannot be changed, so think of one that is secure and easy to remember. 6. Create a [employer]ot password. You can change your password at any time. 7. Enter your Password Reset Question and Answer. This can be used at a later time if you forget your password. 8. Enter your e-mail address. You will receive e-mail notification when new information is available in [address] 9. Click Sign Up. You can now view and download portions of your medical record. 10. Click the Download Summary menu link to download a portable copy of your medical information. If you have questions, please visit the Frequently Asked Questions section of the [employer] website. Remember, [employer] is NOT to be used for urgent needs. For medical emergencies, dial 911. Now available from your iPhone and Android! Please provide this summary of care documentation to your next provider. Your primary care clinician is listed as Natasha Morel. If you have any questions after today's visit, please call [phone].

## 2018-05-02 ENCOUNTER — DOCUMENTATION ONLY (OUTPATIENT)
Dept: INTERNAL MEDICINE CLINIC | Age: 38
End: 2018-05-02

## 2018-06-14 ENCOUNTER — OFFICE VISIT (OUTPATIENT)
Dept: INTERNAL MEDICINE CLINIC | Age: 38
End: 2018-06-14

## 2018-06-14 VITALS
WEIGHT: 149 LBS | HEIGHT: 64 IN | BODY MASS INDEX: 25.44 KG/M2 | HEART RATE: 72 BPM | DIASTOLIC BLOOD PRESSURE: 72 MMHG | SYSTOLIC BLOOD PRESSURE: 109 MMHG | OXYGEN SATURATION: 98 % | TEMPERATURE: 98.4 F

## 2018-06-14 DIAGNOSIS — J32.9 SINUSITIS, UNSPECIFIED CHRONICITY, UNSPECIFIED LOCATION: Primary | ICD-10-CM

## 2018-06-14 DIAGNOSIS — M54.50 ACUTE LEFT-SIDED LOW BACK PAIN WITHOUT SCIATICA: ICD-10-CM

## 2018-06-14 RX ORDER — AZITHROMYCIN 250 MG/1
TABLET, FILM COATED ORAL
Qty: 6 TAB | Refills: 0 | Status: SHIPPED | OUTPATIENT
Start: 2018-06-14 | End: 2018-06-19

## 2018-06-14 NOTE — MR AVS SNAPSHOT
[address] [place] [address]. [place] [id number] 
[phone] Patient: Forrest Rangel MRN: [id number] NSV:[id number] Visit Information Date & Time Provider Department Dept. Phone Encounter #  
 6/14/2018  4:15 PM [facility] [phone] [id number] Follow-up Instructions Return if symptoms worsen or fail to improve. Follow-up and Disposition History Upcoming Health Maintenance Date Due  
 PAP AKA CERVICAL CYTOLOGY 7/8/2017 Influenza Age 5 to Adult 8/1/2018 DTaP/Tdap/Td series (2 - Td) 3/20/2028 Allergies as of 6/14/2018  Review Complete On: 6/14/2018 By: Esther Mcnally No Known Allergies Current Immunizations  Never Reviewed Name Date Hep A Vaccine (Adult) 3/20/2018 Influenza Vaccine 10/5/2017 Tdap 3/20/2018 Not reviewed this visit You Were Diagnosed With   
  
 Codes Comments Sinusitis, unspecified chronicity, unspecified location    -  Primary ICD-10-CM: J32.9 ICD-9-CM: 473.9 Acute left-sided low back pain without sciatica     ICD-10-CM: M54.5 ICD-9-CM: 724.2 BMI 25.0-25.9,adult     ICD-10-CM: K72.49 ICD-9-CM: V85.21 Vitals BP Pulse Temp Height(growth percentile) Weight(growth percentile) SpO2  
 109/72 (BP 1 Location: Right arm, BP Patient Position: Sitting) 72 98.4 °F (36.9 °C) (Oral) 5' 4\" (1.626 m) 149 lb (67.6 kg) 98% BMI OB Status Smoking Status 25.58 kg/m2 Having regular periods Never Smoker Vitals History BMI and BSA Data Body Mass Index Body Surface Area 25.58 kg/m 2 1.75 m 2 Preferred Pharmacy Pharmacy Name Phone [address], [address] [place] [address]  [phone] Your Updated Medication List  
  
   
This list is accurate as of 6/14/18  5:32 PM.  Always use your most recent med list.  
  
  
  
  
 azithromycin 250 mg tablet Commonly known as:  Cynthia Caller Take 2 tablets today, then take 1 tablet daily EMOQUETTE 0.15-0.03 mg Tab Generic drug:  desogestrel-ethinyl estradiol Take 1 Tab by mouth daily. * guaiFENesin-codeine 100-10 mg/5 mL solution Commonly known as:  ROBITUSSIN AC Take 5 mL by mouth three (3) times daily as needed for Cough. Max Daily Amount: 15 mL. * guaiFENesin-codeine 100-10 mg/5 mL solution Commonly known as:  ROBITUSSIN AC Take 5 mL by mouth three (3) times daily as needed for Cough. Max Daily Amount: 15 mL. ZyrTEC 10 mg Cap Generic drug:  Cetirizine Take  by mouth. * Notice: This list has 2 medication(s) that are the same as other medications prescribed for you. Read the directions carefully, and ask your doctor or other care provider to review them with you. Prescriptions Sent to Pharmacy Refills  
 azithromycin (ZITHROMAX) 250 mg tablet 0 Sig: Take 2 tablets today, then take 1 tablet daily Class: Normal  
 Pharmacy: [facility] [address] POORNIMA HODGSON [address] [facility],[address], [facility] [address] #: [phone] We Performed the Following REFERRAL TO PHYSICAL THERAPY [MEZ19 Custom] Follow-up Instructions Return if symptoms worsen or fail to improve. Referral Information Referral ID Referred By Referred To  
  
 [id number] [facility]   
   [address]   
   [place], [address] Phone: [phone] Fax: [phone] Visits Status Start Date End Date  
 20 New Request 6/14/18 6/14/19 If your referral has a status of pending review or denied, additional information will be sent to support the outcome of this decision. Introducing [facility]! Dona Renae introduces [employer] patient portal. Now you can access parts of your medical record, email your doctor's office, and request medication refills online. 1. In your internet browser, go to [email or web address] 2. Click on the First Time User? Click Here link in the Sign In box. You will see the New Member Sign Up page. 3. Enter your [employer] Access Code exactly as it appears below. You will not need to use this code after youve completed the sign-up process. If you do not sign up before the expiration date, you must request a new code. · [employer] Access Code: [email or web address] Expires: 9/12/2018  4:29 PM 
 
4. Enter the last four digits of your Social Security Number (xxxx) and Date of Birth (mm/dd/yyyy) as indicated and click Submit. You will be taken to the next sign-up page. 5. Create a [employer] ID. This will be your [employer] login ID and cannot be changed, so think of one that is secure and easy to remember. 6. Create a [employer] password. You can change your password at any time. 7. Enter your Password Reset Question and Answer. This can be used at a later time if you forget your password. 8. Enter your e-mail address. You will receive e-mail notification when new information is available in [address] 9. Click Sign Up. You can now view and download portions of your medical record. 10. Click the Download Summary menu link to download a portable copy of your medical information. If you have questions, please visit the Frequently Asked Questions section of the [employer] website. Remember, [employer] is NOT to be used for urgent needs. For medical emergencies, dial 911. Now available from your iPhone and Android! Please provide this summary of care documentation to your next provider. Your primary care clinician is listed as Candace Hawthorne. If you have any questions after today's visit, please call [phone].

## 2018-06-14 NOTE — PROGRESS NOTES
Chief Complaint   Patient presents with    URI     x 2 weeks     she is a 41y.o. year old female who presents for evaluation of URI. congestion, post nasal drip and productive cough for 2 weeks. No fevers. no nausea and no vomiting . No sneezing, dry cough, headache and  sinus pain. Over-the-counter remedies including Mucinex, Seudofed, Benedryl, and Allegra   . Hx Asthma:  no  Smoker:  no  Contacts with similar infections: yes   Recent travel:yes   Sputum Description: green      Reviewed and agree with Nurse Note and duplicated in this note. Reviewed PmHx, RxHx, FmHx, SocHx, AllgHx and updated and dated in the chart. No family history on file. No past medical history on file.    Social History     Social History    Marital status:      Spouse name: N/A    Number of children: N/A    Years of education: N/A     Social History Main Topics    Smoking status: Never Smoker    Smokeless tobacco: Never Used    Alcohol use Not on file    Drug use: Not on file    Sexual activity: Not on file     Other Topics Concern    Not on file     Social History Narrative        Review of Systems - negative except as listed above      Objective:     Vitals:    06/14/18 1610   BP: 109/72   Pulse: 72   Temp: 98.4 °F (36.9 °C)   TempSrc: Oral   SpO2: 98%   Weight: 149 lb (67.6 kg)   Height: 5' 4\" (1.626 m)       Physical Examination: General appearance - alert, well appearing, and in no distress  Eyes - pupils equal and reactive, extraocular eye movements intact  Ears - bilateral TM's and external ear canals normal  Nose - normal and patent, no erythema, discharge or polyps  Mouth - mucous membranes moist, pharynx normal without lesions  Neck - supple, no significant adenopathy  Chest - clear to auscultation, no wheezes, rales or rhonchi, symmetric air entry  Heart - normal rate, regular rhythm, normal S1, S2, no murmurs, rubs, clicks or gallops  Abdomen - soft, nontender, nondistended, no masses or organomegaly  Back exam - tenderness noted left low back , negative straight-leg raise bilaterally , normal reflexes and strength bilateral lower extremities  Neurological - alert, oriented, normal speech, no focal findings or movement disorder noted  Musculoskeletal - no joint tenderness, deformity or swelling  Extremities - peripheral pulses normal, no pedal edema, no clubbing or cyanosis  Skin - normal coloration and turgor, no rashes, no suspicious skin lesions noted    Assessment/ Plan:   Diagnoses and all orders for this visit:    1. Sinusitis, unspecified chronicity, unspecified location    2. Acute left-sided low back pain without sciatica  -     REFERRAL TO PHYSICAL THERAPY  Physical therapy for manipulation of left low back  3. BMI 25.0-25.9,adult    Other orders  -     azithromycin (ZITHROMAX) 250 mg tablet; Take 2 tablets today, then take 1 tablet daily       Follow-up Disposition: Not on File  Adults: For nasal congestion, cough and cold/flu symptoms I advised:    - Seek medical care if symptoms become more severe or if you develop      chest pain, shortness of breath, confusion.    - Contact us if your symptoms fail to improve after 7-10 days   - Rest as much as possible and stay home from work/school at least 24 hours                  after last fever              - Wash hand frequently and cough/sneeze into your sleeve to help prevent       infection of others   - Drink plenty of fluids   - Ibuprofen (Advil, Motrin) 400-800mg every 6 hours or                  Aleve 220 mg 1-2 pills every 8 hours for fever, headache, pain   - Tylenol extra strength 500 mg every 6 hours for pain, headache, fever   - Nasal saline rinses 2-3 times daily for nasal congestion   - Mucinex 1200 mg twice daily or Guaifenesin 400 mg every 4 hours for chest       congestion              - Robitussin DM or Delsym for cough(suppress cough and thin mucus.  )   - Cepacol throat lozenges and saline gargles (1 tsp salt in 8 oz water) for sore       throat   - Tea with honey for cough (buckwheat honey preferred)              - Benadryl (diphenhydramine) 50 mg at night for nasal congestion/allergies   - Pseudoephedrine 12-hour tablets twice daily for nasal and inner ear       -Ask your pharmacist (this is kept behind the counter)     -If you have high blood pressure or heart disease, use this        medication with caution (ask your doctor), alternative coricidin   - Afrin (oxymetazoline) nasal spray 2 sprays in each nostril twice daily for severe      congestion.       -Do not use this medication for more than 3 days as it may cause         \"rebound congestion\". -If you have high blood pressure or heart disease, use this medication       with caution (ask your doctor)      Children:   Sit in bathroom with hot shower running for steam.    Nasal saline rinses and Neti pot  In general for viral respiratory infection -  Aim for drainage/increased airflow  Increase fluids - Pedialyte popsicles, Gatorade mixed with 50% water         1) Remember to stay active and/or exercise regularly (I suggest 30-45 minutes daily)   2) For reliable dietary information, go to www. EATRIGHT.org. You may wish to consider seeing the nutritionist at [facility] [phone], also consider the [address] I have discussed the diagnosis with the patient and the intended plan as seen in the above orders. The patient has received an after-visit summary and questions were answered concerning future plans. Medication Side Effects and Warnings were discussed with patient: yes  Patient Labs were reviewed and or requested: yes  Patient Past Records were reviewed and or requested  yes  I have discussed the diagnosis with the patient and the intended plan as seen in the above orders. The patient has received an after-visit summary and questions were answered concerning future plans.      Pt agrees to call or return to clinic and/or go to closest ER with any worsening of symptoms. This may include, but not limited to increased fever (>100.4) with NSAIDS or Tylenol, increased edema, confusion, rash, worsening of presenting symptoms.

## 2018-06-28 ENCOUNTER — HOSPITAL ENCOUNTER (OUTPATIENT)
Dept: PHYSICAL THERAPY | Age: 38
Discharge: HOME | End: 2018-06-28
Payer: COMMERCIAL

## 2018-06-28 PROCEDURE — 97161 PT EVAL LOW COMPLEX 20 MIN: CPT

## 2018-06-28 PROCEDURE — 97110 THERAPEUTIC EXERCISES: CPT

## 2018-06-28 PROCEDURE — 97140 MANUAL THERAPY 1/> REGIONS: CPT

## 2018-06-28 NOTE — PROGRESS NOTES
PT INITIAL EVALUATION NOTE 2-15    Patient Name: Collette Gemma  Date:2018  : 1980  [x]  Patient  Verified  Payor: /    In time: 11:14 am  Out time:12:08 pm  Total Treatment Time (min): 54 minutes  Visit #: 1     Treatment Area: There are no admission diagnoses documented for this encounter. SUBJECTIVE  Pain Level (0-10 scale): 1/10  Any medication changes, allergies to medications, adverse drug reactions, diagnosis change, or new procedure performed?: [] No    [x] Yes (see summary sheet for update)  Subjective:     Pt was referred to skilled PT for acute L-sided LBP without sciatica. Today, Pt reports primary complaints of intermittent throbbing L LBP that occasionally radiates to cause \"pulsating\" L hip flexor pain which lasts for 10-15 minutes. Mechanism of Injury: Initial injury occurred 2 years ago while performing a plank with R hip flexed to elbow and L hip extended up in air. Symptoms were completely resolved, but re-aggravated in 2018 during pose in yoga (half-push-up pose). Pt denies any numbness/tingling or pain in lower extremity. Aggravating factors include: prolonged standing at work, stepping to L occasionally. Relieving factors include yoga (forward folds), hip extensor stretches, foam roller on hip flexor and L-sided L low back. Patient denies any functional limitations, but the pain is more of an \"annoyance\" that she wants to get right before it gets worse. PLOF: Pole dancing competitions; yoga 5-7x/day. Previous Treatment/Compliance: NA   Work Hx: Pharmacist    Living Situation: Pt lives in a 1-story house with 3 stairs to enter. PMHx/Surgical Hx: NA.      Pt Goals: Be normal again without constant reminder of pain. OBJECTIVE    Posture:  Normal  Other Observations:  NA  Gait and Functional Mobility:  No significant abnormalities  Palpation: No tenderness, but Pt noted with finger test that normal pain is at L PSIS.   Pt reported localized tingling with palpation of L piriformis muscle only. DL Squats: Normal   L SL Squats: Genu valgus, mod decreased knee control  R SL Squats: min decreased knee control    Balance Normal  R/L Side lunges: Normal, no increase in pain          Lumbar AROM:          R      L    Flexion    100% eases pain      Extension   100% min increased pain L low back      Side Bending   100% stretching/pain in L hip flexor  100% eases the pain         Flexibility: Above-average hamstring, hip flexor flexibility bilaterally  Mobility Assessment: Normal      MMT:               HIP Ext (knee bent): R: 4+; L: 4-              HIP Abd: R: 5; L 4  Neurological: Reflexes / Sensations: NT  Special Tests:    Trendelenberg: (-)    FABERS: (-)   Forward Bend: (-)    Slump: NT   H.S. SLR: (-)     Piriformis Ext: (+) L only   Long Sit: (+) R>L    Lumbar Distraction: NT   SI Compression/Distraction: (-)   [place]'s: (+) with R hip EXT and L hip flexion    15 min Therapeutic Exercise:  [x] See flow sheet :   Rationale: increase ROM and increase strength to improve the patients ability to return to PLOF without pain. 10 min Manual Therapy:  Shot-gun technique; MET for L posteriorly rotated innominate   Rationale: decrease pain and increase tissue extensibility  to improve the patients ability to return to PLOF without pain.       With   [x] TE   [] TA   [] neuro   [x] other: Patient Education: [x] Review HEP    [] Progressed/Changed HEP based on:   [] positioning   [] body mechanics   [] transfers   [] heat/ice application    [x] other: Educated Pt regarding anatomy of SI joint, potential differential diagnoses, impairments, and POC       Other Objective/Functional Measures:  FOTO Score: 94/100    Pain Level (0-10 scale) post treatment: 0/10      ASSESSMENT:      [x]  See Plan of [address] 6/28/2018  10:54 AM

## 2018-06-28 NOTE — PROGRESS NOTES
[employer] [facility]  [address] ([place]), [address] [facility] Klarissa Crystal  Phone: [phone] Fax: [phone]    Plan of Care/Statement of Necessity for Physical Therapy Services  2-15    Patient name: Kaitlin Hernández  : 1980  Provider#: [id number]  Referral source: Kallie Carrasco MD      Medical/Treatment Diagnosis: There are no admission diagnoses documented for this encounter. Prior Hospitalization: see medical history        Comorbidities: Back pain  Prior Level of Function: Patient completed 20 minutes of exercise at least 3 times a week. Medications: Verified on Patient Summary List    Start of Care: 18      Onset Date: 2018       The Plan of Care and following information is based on the information from the initial evaluation. Assessment/ key information: Pt is a very pleasant 41year old female who reports primary complaints of intermittent throbbing L-sided LBP that occasionally wraps around to anterior L hip. Based on examination, patient presents with symptoms that are potentially indicative of SI joint involvement with several associated impairments including hip weakness bilaterally (L>R) and decreased core stability.     Evaluation Complexity History LOW Complexity : Zero comorbidities / personal factors that will impact the outcome / POC; Examination MEDIUM Complexity : 3 Standardized tests and measures addressing body structure, function, activity limitation and / or participation in recreation  ;Presentation LOW Complexity : Stable, uncomplicated  ;Clinical Decision Making LOW Complexity : FOTO score of   Overall Complexity Rating: LOW     Problem List: pain affecting function and decrease strength   Treatment Plan may include any combination of the following: Therapeutic exercise, Therapeutic activities, Neuromuscular re-education, Physical agent/modality, Manual therapy and Patient education  Patient / Family readiness to learn indicated by: asking questions, trying to perform skills and interest  Persons(s) to be included in education: patient (P)  Barriers to Learning/Limitations: None  Patient Goal (s): Stop the pain  Patient Self Reported Health Status: excellent  Rehabilitation Potential: excellent    Short Term/Long Term Goals: To be accomplished in 6 treatments:   1. Pt will be independent and compliant with HEP. 2. Pt will improve FOTO score by the MCID from 94/100 to 95/100 demonstrating improved overall function with decreased pain or discomfort. 3. Pt will be able to perform 10 SL squats bilaterally without genu valgus demonstrating improved hip strength and stability. 4. Pt will be able to hold a side plank bilaterally for >/= 45 seconds demonstrating improved core strength. 5. Pt will be able to work a full shift without complaints of LBP or L hip pain 100% of the time in order to perform personal and professional activities without restriction or pain. Frequency / Duration: Patient to be seen 1 times per week for 12 treatments. Patient/ Caregiver education and instruction: self care, activity modification and exercises    [x]  Plan of care has been reviewed with YAHAIRA Vigil 6/28/2018 10:56 AM    ________________________________________________________________________    I certify that the above Therapy Services are being furnished while the patient is under my care. I agree with the treatment plan and certify that this therapy is necessary.     [de-identified] Signature:____________________  Date:____________Time: _________

## 2018-07-06 ENCOUNTER — HOSPITAL ENCOUNTER (OUTPATIENT)
Dept: PHYSICAL THERAPY | Age: 38
Discharge: HOME | End: 2018-07-06
Payer: COMMERCIAL

## 2018-07-06 PROCEDURE — 97140 MANUAL THERAPY 1/> REGIONS: CPT

## 2018-07-06 PROCEDURE — 97110 THERAPEUTIC EXERCISES: CPT

## 2018-07-06 NOTE — PROGRESS NOTES
PT DAILY TREATMENT NOTE 2-15    Patient Name: Lucia Dear  Date:2018  : 1980  [x]  Patient  Verified  Payor: BLUE CROSS / Plan: Jarontushar Malhotra [id number] PPO / Product Type: PPO /    In time:7:29 am  Out time:8:18 am  Total Treatment Time (min): 49 minutes  Visit #: 2     Treatment Area: There are no admission diagnoses documented for this encounter. SUBJECTIVE  Pain Level (0-10 scale): 0/10  Any medication changes, allergies to medications, adverse drug reactions, diagnosis change, or new procedure performed?: [x] No    [] Yes (see summary sheet for update)  Subjective functional status/changes:   [] No changes reported  Pt reports pain has greatly improved and she is now having pain-free days. She denies any sharp pains anymore. She reports she had min discomfort after initial session, but she thinks it was more muscle soreness that went away. She noticed her pain is more localized to low back at this point when she gets ache. She noticed occasional cavitation with exercises at home which feel good. She notices her L hip is weaker vs R when doing her exercises. OBJECTIVE    37 min Therapeutic Exercise:  [x] See flow sheet :   Rationale: increase ROM, increase strength and increase proprioception to improve the patients ability to perform all activities with decreased pain or discomfort. 12 min Manual Therapy:  Shot-gun technique x2; MET for L posteriorly rotated innominate   Rationale: decrease pain and increase tissue extensibility  to improve the patients ability to perform all activities without pain or discomfort.           With   [x] TE   [] TA   [] neuro   [] other: Patient Education: [x] Review HEP    [] Progressed/Changed HEP based on:   [] positioning   [] body mechanics   [] transfers   [] heat/ice application    [] other:      Other Objective/Functional Measures: NA     Pain Level (0-10 scale) post treatment: 0/10    ASSESSMENT/Changes in Function:   Pt able to perform all exercises without pain or discomfort. She noted fatigue in hips bilaterally with side steps and monster walks. Pt reported resisted bird dogs a good challenge that she could feel activate her core. Continue emphasizing and progressing core and hip stability in order to match her mobility. Patient will continue to benefit from skilled PT services to modify and progress therapeutic interventions, address functional mobility deficits, address ROM deficits, address strength deficits, analyze and address soft tissue restrictions, analyze and cue movement patterns, analyze and modify body mechanics/ergonomics and assess and modify postural abnormalities to attain remaining goals. []  See Plan of Care  []  See progress note/recertification  []  See Discharge Summary         Progress towards goals / Updated goals:  Short Term/Long Term Goals: To be accomplished in 6 treatments:                         1. Pt will be independent and compliant with HEP. 2. Pt will improve FOTO score by the MCID from 94/100 to 95/100 demonstrating improved overall function with decreased pain or discomfort. 3. Pt will be able to perform 10 SL squats bilaterally without genu valgus demonstrating improved hip strength and stability. 4. Pt will be able to hold a side plank bilaterally for >/= 45 seconds demonstrating improved core strength. 5. Pt will be able to work a full shift without complaints of LBP or L hip pain 100% of the time in order to perform personal and professional activities without restriction or pain.      PLAN  [x]  Upgrade activities as tolerated     [x]  Continue plan of care  [x]  Update interventions per flow sheet       []  Discharge due to:_  []  Other:_      Moshe Villarreal 7/6/2018  6:59 AM

## 2018-07-11 ENCOUNTER — HOSPITAL ENCOUNTER (OUTPATIENT)
Dept: PHYSICAL THERAPY | Age: 38
Discharge: HOME | End: 2018-07-11
Payer: COMMERCIAL

## 2018-07-11 PROCEDURE — 97110 THERAPEUTIC EXERCISES: CPT

## 2018-07-11 PROCEDURE — 97140 MANUAL THERAPY 1/> REGIONS: CPT

## 2018-07-11 NOTE — PROGRESS NOTES
PT DAILY TREATMENT NOTE - [facility] 2-15    Patient Name: Aguilar Morris  Date:2018  : 1980  [x]  Patient  Verified  Payor: BLUE CROSS / Plan: Michael Malhotra [id number] PPO / Product Type: PPO /    In time:10:00 am  Out time:10:54 am  Total Treatment Time (min): 54 minutes  Total Timed Codes (min): 54 minutes  1:1 Treatment Time ( only): 54 minutes   Visit #: 3     Treatment Area: Low back pain [M54.5]    SUBJECTIVE  Pain Level (0-10 scale): 1/10  Any medication changes, allergies to medications, adverse drug reactions, diagnosis change, or new procedure performed?: [x] No    [] Yes (see summary sheet for update)  Subjective functional status/changes:   [] No changes reported  Pt reports that she is feeling stiffness in L low back this morning. She has had occasional pain in L low back and notes that planks may be irritating pain in low back. She notes that she has not been doing her self-MET very consistently, but has tried to do her exercises every other day or so.   Pt also states monster walks causing significant fatigue in quads rather than hips.     OBJECTIVE     44 Min Therapeutic Exercise:  [x] See flow sheet :   Rationale: increase ROM, increase strength and increase proprioception to improve the patients ability to perform all activities with decreased pain or discomfort.     10 min Manual Therapy:  Shot-gun technique x2; MET for L posteriorly rotated innominate   Rationale: decrease pain and increase tissue extensibility  to improve the patients ability to perform all activities without pain or discomfort.      With   [x] TE   [] TA   [] neuro   [] other: Patient Education: [x] Review HEP    [] Progressed/Changed HEP based on:   [] positioning   [] body mechanics   [] transfers   [] heat/ice application    [] other:       Other Objective/Functional Measures: NA                           Pain Level (0-10 scale) post treatment: 010     ASSESSMENT/Changes in Function:   Pt noted significant fatigue in hamstrings bilaterally with SWB bridges, but was able to tolerate without pain. Pt did not report any complaints of pain or discomfort after plank series, with cues for decreased lumbar lordosis in prone and increased elevation of hips in side planks for maximal gluteal/core activation and decreased stress on low back. Cued monster walks to modify for wider steps to isolate more gluteal musculature and minimize quad compensation; Pt noted feeling it much more in her hips rather than quads. Patient will continue to benefit from skilled PT services to modify and progress therapeutic interventions, address functional mobility deficits, address ROM deficits, address strength deficits, analyze and address soft tissue restrictions, analyze and cue movement patterns, analyze and modify body mechanics/ergonomics and assess and modify postural abnormalities to attain remaining goals.      []  See Plan of Care  []  See progress note/recertification  []  See Discharge Summary      Progress towards goals / Updated goals:  Short Term/Long Term Goals: To be accomplished in 6 treatments:                         1. Pt will be independent and compliant with HEP.                        8. Pt will improve FOTO score by the MCID from 94/100 to 95/100 demonstrating improved overall function with decreased pain or discomfort.                          3. Pt will be able to perform 10 SL squats bilaterally without genu valgus demonstrating improved hip strength and stability.                        6. Pt will be able to hold a side plank bilaterally for >/= 45 seconds demonstrating improved core strength.                           5.  Pt will be able to work a full shift without complaints of LBP or L hip pain 100% of the time in order to perform personal and professional activities without restriction or pain.      PLAN  [x]  Upgrade activities as tolerated     [x]  Continue plan of care  [x]  Update interventions per flow sheet       []  Discharge due to:_  []  Other:_        Janessa Angeles 7/11/2018  7:08 AM

## 2018-07-18 ENCOUNTER — HOSPITAL ENCOUNTER (OUTPATIENT)
Dept: PHYSICAL THERAPY | Age: 38
Discharge: HOME | End: 2018-07-18
Payer: COMMERCIAL

## 2018-07-18 PROCEDURE — 97110 THERAPEUTIC EXERCISES: CPT

## 2018-07-18 NOTE — PROGRESS NOTES
PT DAILY TREATMENT NOTE 2-15    Patient Name: Lucia Dear  Date:2018  : 1980  [x]  Patient  Verified  Payor: BLUE SHANNON / Plan: Michael Malhotra [id number] PPO / Product Type: PPO /    In time:10:03 am  Out time:11:11 am  Total Treatment Time (min): 68 minutes  Visit #: 4     Treatment Area: Low back pain [M54.5]    SUBJECTIVE  Pain Level (0-10 scale): 0/10  Any medication changes, allergies to medications, adverse drug reactions, diagnosis change, or new procedure performed?: [x] No    [] Yes (see summary sheet for update)  Subjective functional status/changes:   [] No changes reported  Pt reports she has not been having back pain since last session. When she has started to notice discomfort she has done exercises, beginning with L standing clam shells which relieves her pain and she hears \"pop\" in hips which she feels brings them into alignment.  Pt states she was more diligent with self-MET at home which also seemed helpful.         OBJECTIVE      5 Min Therapeutic Exercise:  [x] See flow sheet :   Rationale: increase ROM, increase strength and increase proprioception to improve the patients ability to perform all activities with decreased pain or discomfort.      -- min Manual Therapy:  Shot-gun technique x2; MET for L posteriorly rotated innominate   Rationale: decrease pain and increase tissue extensibility  to improve the patients ability to perform all activities without pain or discomfort.      With   [x] TE   [] TA   [] neuro   [x] other: Patient Education: [x] Review HEP    [] Progressed/Changed HEP based on:   [] positioning   [] body mechanics   [] transfers   [] heat/ice application    [x] other: Discussed plan to D/C next session barring setback.       Other Objective/Functional Measures: NA                            Pain Level (0-10 scale) post treatment: 0/10      ASSESSMENT/Changes in Function:   Did not perform manual treatment today secondary to hips demonstrating improved alignment upon assessment. Initiated fire hydrants in quadruped today with Pt noting significantly more fatigue L vs R. Also introduced several rotational core strengthening exercises on cable column facilitating more functional core stability; no complaints of back pain. Utilized mirror for visual feedback with single leg squats along with theraband resistance for proprioceptive cuing to decrease dynamic valgus. Patient will continue to benefit from skilled PT services to modify and progress therapeutic interventions, address functional mobility deficits, address ROM deficits, address strength deficits, analyze and address soft tissue restrictions, analyze and cue movement patterns, analyze and modify body mechanics/ergonomics and assess and modify postural abnormalities to attain remaining goals.      [x]  See Plan of Care  []  See progress note/recertification  []  See Discharge Summary      Progress towards goals / Updated goals:  Short Term/Long Term Goals: To be accomplished in 6 treatments:                         1. Pt will be independent and compliant with HEP.                        5. Pt will improve FOTO score by the MCID from 94/100 to 95/100 demonstrating improved overall function with decreased pain or discomfort.                          3. Pt will be able to perform 10 SL squats bilaterally without genu valgus demonstrating improved hip strength and stability.                        6. Pt will be able to hold a side plank bilaterally for >/= 45 seconds demonstrating improved core strength.                           5.  Pt will be able to work a full shift without complaints of LBP or L hip pain 100% of the time in order to perform personal and professional activities without restriction or pain.       PLAN  [x]  Upgrade activities as tolerated     [x]  Continue plan of care  [x]  Update interventions per flow sheet       []  Discharge due to:_  []  Other:_        Green Scrape Sherley 7/18/2018  7:12 AM

## 2018-07-26 ENCOUNTER — HOSPITAL ENCOUNTER (OUTPATIENT)
Dept: PHYSICAL THERAPY | Age: 38
Discharge: HOME | End: 2018-07-26
Payer: COMMERCIAL

## 2018-07-26 PROCEDURE — 97110 THERAPEUTIC EXERCISES: CPT

## 2018-07-26 NOTE — PROGRESS NOTES
PT DAILY TREATMENT NOTE - [facility] 2-15    Patient Name: Belinda Ward  Date:2018  : 1980  [x]  Patient  Verified  Payor: BLUE CROSS / Plan: Michael Malhotra [id number] PPO / Product Type: PPO /    In time:10:00 am  Out time:11:01  Total Treatment Time (min): 61 minutes  Total Timed Codes (min): 61 minutes  1:1 Treatment Time ( only): 61 minutes   Visit #: 5     Treatment Area: Low back pain [M54.5]    SUBJECTIVE  Pain Level (0-10 scale): 0/10  Any medication changes, allergies to medications, adverse drug reactions, diagnosis change, or new procedure performed?: [x] No    [] Yes (see summary sheet for update)  Subjective functional status/changes:   [] No changes reported  Pt states her hip has been feeling really good. She has been working long shifts (15 hours), but no complaints of pain. Standing clam shells and fire hydrants relieve all discomfort if she begins to notice any soreness. Her R knee has been somewhat sore recently, particularly with SL squats. She notes initial injury to R knee was years ago and it occasionally flares up.        OBJECTIVE      64 Min Therapeutic Exercise:  [x] See flow sheet :   Rationale: increase ROM, increase strength and increase proprioception to improve the patients ability to perform all activities with decreased pain or discomfort.      -- min Manual Therapy:  Shot-gun technique x2; MET for L posteriorly rotated innominate   Rationale: decrease pain and increase tissue extensibility  to improve the patients ability to perform all activities without pain or discomfort.      With   [x] TE   [] TA   [] neuro   [x] other: Patient Education: [x] Review HEP    [] Progressed/Changed HEP based on:   [] positioning   [] body mechanics   [] transfers   [] heat/ice application    [x] other: Due to development of min knee pain, scheduling 2 additional appointments.  Instructed Pt to discontinue SL squats and avoid any exercises that irritate knee, and to ice knee 1x/day.       Other Objective/Functional Measures:     FOTO Score: 94/100  0-100: 98% improved                           Pain Level (0-10 scale) post treatment: 0/10      ASSESSMENT/Changes in Function:   Based on assessment, Pt presents with mild patellar tendonitis of R knee; instructed Pt to avoid SL squats and exercises that may put increased stress on knee joint. Pt performed BOSU bilateral squats without increased pain in R knee after cues to decrease knee flexion which eased any discomfort. []  See Plan of Care  [x]  See progress note/recertification  []  See Discharge Summary      Progress towards goals / Updated goals:  Short Term/Long Term Goals: To be accomplished in 6 treatments:                         6. Pt will be independent and compliant with HEP. - met                         6. Pt will improve FOTO score by the MCID from 94/100 to 95/100 demonstrating improved overall function with decreased pain or discomfort. [phone]. Pt will be able to perform 10 SL squats bilaterally without genu valgus demonstrating improved hip strength and stability.  (did not assess due to R knee pain)                         4. Pt will be able to hold a side plank bilaterally for >/= 45 seconds demonstrating improved core strength.   - met (60 seconds)                         5. Pt will be able to work a full shift without complaints of LBP or L hip pain 100% of the time in order to perform personal and professional activities without restriction or pain.    - met      PLAN  [x]  Upgrade activities as tolerated     [x]  Continue plan of care  [x]  Update interventions per flow sheet       []  Discharge due to:_  []  Other:_         Cassi Mcfadden 7/26/2018  9:58 AM

## 2018-07-26 NOTE — PROGRESS NOTES
[facility]  Arsenio Rolle (MOB IV), [address]  [address] [place]  Phone: [phone] Fax: [phone]    Progress Note    Name: Dory Geronimo   : 1980   MD: Althea Mendez MD       Treatment Diagnosis: Low back pain [M54.5]  Start of Care: 18    Visits from Start of Care: 5  Missed Visits: 0    Summary of Care: Pt has been seen for 5 skilled physical therapy visits for L-sided low back and anterior L hip pain. Pt has progressed very well with her therapy program which has focused on increasing dynamic hip and core strength and stability as well as improving alignment of SI joint via therapeutic exercise and manual therapy techniques. 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 Her hip alignment has significantly improved, and Pt also demonstrates increased hip and core strength based on ability to perform progressive resistance exercise. She reported today with min soreness in R knee which, based on assessment, appears to be min R patellar tendonitis. Recommend 2 additional sessions over next 4 weeks to monitor progress, ensure no set backs, and further develop comprehensive HEP to perform independently. Thank you for this referral.    Assessment / Recommendations: 2 additional sessions over the next 4 weeks. Short Term/Long Term Goals: To be accomplished in 6 treatments:                         4. Pt will be independent and compliant with HEP. - met                         1. Pt will improve FOTO score by the MCID from 94/100 to 95/100 demonstrating improved overall function with decreased pain or discomfort.   [phone]. Pt will be able to perform 10 SL squats bilaterally without genu valgus demonstrating improved hip strength and stability.  (did not assess due to R knee pain)                         4. Pt will be able to hold a side plank bilaterally for >/= 45 seconds demonstrating improved core strength.   - met (60 seconds)                         5. Pt will be able to work a full shift without complaints of LBP or L hip pain 100% of the time in order to perform personal and professional activities without restriction or pain. - met      Petra Malik 7/26/2018 10:01 AM    ________________________________________________________________________  NOTE TO PHYSICIAN:  Please complete the following and fax to: [facility]: [phone]  . Retain this original for your records. If you are unable to process this request in 24 hours, please contact our office.        ____ I have read the above report and request that my patient continue therapy with the following changes/special instructions:  ____ I have read the above report and request that my patient be discharged from therapy    Physician's Signature:_________________ Date:___________Time:__________

## 2018-08-06 ENCOUNTER — HOSPITAL ENCOUNTER (OUTPATIENT)
Dept: PHYSICAL THERAPY | Age: 38
Discharge: HOME | End: 2018-08-06
Payer: COMMERCIAL

## 2018-08-06 PROCEDURE — 97110 THERAPEUTIC EXERCISES: CPT

## 2018-08-06 NOTE — PROGRESS NOTES
PT DAILY TREATMENT NOTE - [facility] 2-15    Patient Name: Devonda Schwab  Date:2018  : 1980  [x]  Patient  Verified  Payor: BLUE CROSS / Plan: Michael Malhotra [id number] PPO / Product Type: PPO /    In time:8:01 am   Out time:8:45 am  Total Treatment Time (min): 44 minutes  Total Timed Codes (min): 44 minutes  1:1 Treatment Time (MC only): 30 minutes   Visit #: 6     Treatment Area: Low back pain [M54.5]    SUBJECTIVE  Pain Level (0-10 scale): 0/10  Any medication changes, allergies to medications, adverse drug reactions, diagnosis change, or new procedure performed?: [x] No    [] Yes (see summary sheet for update)  Subjective functional status/changes:   [] No changes reported  Pt reports she was not as diligent with her exercises and noticed feeling weaker in hips/core as a result. Pt reports min stiffness this morning in L-sided low back, but she has not really experienced any pain since last session. Allen De Leon  OBJECTIVE      38 Min Therapeutic Exercise:  [x] See flow sheet :   Rationale: increase ROM, increase strength and increase proprioception to improve the patients ability to perform all activities with decreased pain or discomfort.      -- min Manual Therapy:  Shot-gun technique x2; MET for L posteriorly rotated innominate   Rationale: decrease pain and increase tissue extensibility  to improve the patients ability to perform all activities without pain or discomfort.      With   [x] TE   [] TA   [] neuro   [x] other: Patient Education: [x] Review HEP    [] Progressed/Changed HEP based on:   [] positioning   [] body mechanics   [] transfers   [] heat/ice application    [x] other: Discussed plan to D/C next session barring setback.       Other Objective/Functional Measures: NA                            Pain Level (0-10 scale) post treatment: 0/10      ASSESSMENT/Changes in Function:   Pt able to perform forward lunge walk with rotation without discomfort in R knee; cues for pushing down through Did not perform manual treatment today secondary to hips demonstrating improved alignment upon assessment. Initiated fire hydrants in quadruped today with Pt noting significantly more fatigue L vs R. Also introduced several rotational core strengthening exercises on cable column facilitating more functional core stability; no complaints of back pain. Utilized mirror for visual feedback with single leg squats along with theraband resistance for proprioceptive cuing to decrease dynamic valgus. Patient will continue to benefit from skilled PT services to modify and progress therapeutic interventions, address functional mobility deficits, address ROM deficits, address strength deficits, analyze and address soft tissue restrictions, analyze and cue movement patterns, analyze and modify body mechanics/ergonomics and assess and modify postural abnormalities to attain remaining goals.      [x]  See Plan of Care  []  See progress note/recertification  []  See Discharge Summary      Progress towards goals / Updated goals:  Short Term/Long Term Goals: To be accomplished in 6 treatments:                         1. Pt will be independent and compliant with HEP.                        8. Pt will improve FOTO score by the MCID from 94/100 to 95/100 demonstrating improved overall function with decreased pain or discomfort.                          3. Pt will be able to perform 10 SL squats bilaterally without genu valgus demonstrating improved hip strength and stability.                        7. Pt will be able to hold a side plank bilaterally for >/= 45 seconds demonstrating improved core strength.                           5.  Pt will be able to work a full shift without complaints of LBP or L hip pain 100% of the time in order to perform personal and professional activities without restriction or pain.       PLAN  [x]  Upgrade activities as tolerated     [x]  Continue plan of care  [x]  Update interventions per flow sheet       []  Discharge due to:_  []  Other:_        Diomedes Schroeder 8/6/2018  7:01 AM

## 2018-08-20 ENCOUNTER — HOSPITAL ENCOUNTER (OUTPATIENT)
Dept: PHYSICAL THERAPY | Age: 38
Discharge: HOME | End: 2018-08-20
Payer: COMMERCIAL

## 2018-08-20 PROCEDURE — 97110 THERAPEUTIC EXERCISES: CPT

## 2018-08-20 NOTE — PROGRESS NOTES
PT DAILY TREATMENT NOTE - [facility] 2-15    Patient Name: Lucia Dear  Date:2018  : 1980  [x]  Patient  Verified  Payor: BLUE CROSS / Plan: Michael Malhotra [id number] PPO / Product Type: PPO /    In time:8:00 am  Out time:8:50 am  Total Treatment Time (min): 50 minutes  Total Timed Codes (min): 50 minutes  1:1 Treatment Time ( only): 30 minutes  Visit #: 7     Treatment Area: Low back pain [M54.5]    SUBJECTIVE  Pain Level (0-10 scale): 0/10  Any medication changes, allergies to medications, adverse drug reactions, diagnosis change, or new procedure performed?: [x] No    [] Yes (see summary sheet for update)  Subjective functional status/changes:   [] No changes reported  Pt reports she has been doing very well and is ready for discharge. Pt reports she has gotten into a good routine of finding time to perform several of the exercises each day.   She has not experienced any flare-ups since last session, and notices any discomfort dissipates with performance of her exercises.       OBJECTIVE      50 Min Therapeutic Exercise:  [x] See flow sheet :   Rationale: increase ROM, increase strength and increase proprioception to improve the patients ability to perform all activities with decreased pain or discomfort.      -- min Manual Therapy:  Shot-gun technique x2; MET for L posteriorly rotated innominate   Rationale: decrease pain and increase tissue extensibility  to improve the patients ability to perform all activities without pain or discomfort.      With   [x] TE   [] TA   [] neuro   [] other: Patient Education: [x] Review HEP    [] Progressed/Changed HEP based on:   [] positioning   [] body mechanics   [] transfers   [] heat/ice application    [] other:        Other Objective/Functional Measures:   FOTO Score: 94/100   0-100: 100%                              Pain Level (0-10 scale) post treatment: 0/10      ASSESSMENT/Changes in Function:       []  See Plan of Care  []  See progress note/recertification  [x]  See Discharge Summary      Progress towards goals / Updated goals:  Short Term/Long Term Goals: To be accomplished in 6 treatments:                         7. Pt will be independent and compliant with HEP. - met                          9. Pt will improve FOTO score by the MCID from 94/100 to 95/100 demonstrating improved overall function with decreased pain or discomfort.  - not met (94/100)                         3. Pt will be able to perform 10 SL squats bilaterally without genu valgus demonstrating improved hip strength and stability. - met                           7. Pt will be able to hold a side plank bilaterally for >/= 45 seconds demonstrating improved core strength.  - met (60 seconds)                         5. Pt will be able to work a full shift without complaints of LBP or L hip pain 100% of the time in order to perform personal and professional activities without restriction or pain. - met       PLAN  []  Upgrade activities as tolerated     []  Continue plan of care  []  Update interventions per flow sheet       [x]  Discharge due to: Pt reaching or progressing towards all long-term goals.   []  Other:Elias Mart 8/20/2018  7:02 AM

## 2018-08-20 NOTE — ANCILLARY DISCHARGE INSTRUCTIONS
[facility] Physical Therapy  [address] ([place]), [address] [facility] [place]  Phone: [phone] Fax: [phone]    Discharge Summary  2-15    Patient name: Robin Wright  : 1980  Provider#: [id number]  Referral source: Jenae Arias MD      Medical/Treatment Diagnosis: Low back pain [M54.5]     Prior Hospitalization: see medical history     Comorbidities: See Plan of Care  Prior Level of Function:See Plan of Care  Medications: Verified on Patient Summary List    Start of Care: 18      Onset Date:2018   Visits from Start of Care: 7     Missed Visits: 0  Reporting Period : 18 to 18      ASSESSMENT/SUMMARY OF CARE: Pt has been seen for 7 skilled physical therapy visits secondary to L low back and hip pain. She has progressed extremely well with her therapy program which has focused on improving alignment of pelvis and SIJ, and increasing gluteal and core strength/stability. Pt reports she feels 100% improved since beginning therapy and is now able to perform all work duties and recreational activities without any pain or discomfort. Pt demonstrates improved hip strength evidenced by decreased dynamic valgus with SL squats bilaterally and increased core stability evidenced by longer plank hold times and performance of progressively difficult exercises. Pt has met or is progressing towards all short and long-term goals and has been provided a comprehensive HEP to maintain progress independently. Thank you for this referral.        Short Term/Long Term Goals: To be accomplished in 6 treatments:                         1. Pt will be independent and compliant with HEP.   - met                          4. Pt will improve FOTO score by the MCID from 94/100 to 95/100 demonstrating improved overall function with decreased pain or discomfort.  - not met (94/100)                         3. Pt will be able to perform 10 SL squats bilaterally without genu valgus demonstrating improved hip strength and stability. - met                           4. Pt will be able to hold a side plank bilaterally for >/= 45 seconds demonstrating improved core strength.  - met (60 seconds)                         5. Pt will be able to work a full shift without complaints of LBP or L hip pain 100% of the time in order to perform personal and professional activities without restriction or pain.   - met         RECOMMENDATIONS:  [x]Discontinue therapy: [x]Patient has reached or is progressing toward set goals      []Patient is non-compliant or has abdicated      []Due to lack of appreciable progress towards set goals      []Other    Cassi Mcfadden 8/20/2018 7:05 AM

## 2021-11-16 ENCOUNTER — VIRTUAL VISIT (OUTPATIENT)
Dept: INTERNAL MEDICINE CLINIC | Age: 41
End: 2021-11-16
Payer: COMMERCIAL

## 2021-11-16 DIAGNOSIS — J01.00 ACUTE NON-RECURRENT MAXILLARY SINUSITIS: Primary | ICD-10-CM

## 2021-11-16 PROCEDURE — 99214 OFFICE O/P EST MOD 30 MIN: CPT | Performed by: FAMILY MEDICINE

## 2021-11-16 RX ORDER — AZITHROMYCIN 250 MG/1
TABLET, FILM COATED ORAL
Qty: 6 TABLET | Refills: 0 | Status: SHIPPED | OUTPATIENT
Start: 2021-11-16 | End: 2021-11-21

## 2021-11-16 NOTE — PATIENT INSTRUCTIONS
Viral Respiratory Infection: Care Instructions  Your Care Instructions     Viruses are very small organisms. They grow in number after they enter your body. There are many types that cause different illnesses, such as colds and the mumps. The symptoms of a viral respiratory infection often start quickly. They include a fever, sore throat, and runny nose. You may also just not feel well. Or you may not want to eat much. Most viral respiratory infections are not serious. They usually get better with time and self-care. Antibiotics are not used to treat a viral infection. That's because antibiotics will not help cure a viral illness. In some cases, antiviral medicine can help your body fight a serious viral infection. Follow-up care is a key part of your treatment and safety. Be sure to make and go to all appointments, and call your doctor if you are having problems. It's also a good idea to know your test results and keep a list of the medicines you take. How can you care for yourself at home? · Rest as much as possible until you feel better. · Be safe with medicines. Take your medicine exactly as prescribed. Call your doctor if you think you are having a problem with your medicine. You will get more details on the specific medicine your doctor prescribes. · Take an over-the-counter pain medicine, such as acetaminophen (Tylenol), ibuprofen (Advil, Motrin), or naproxen (Aleve), as needed for pain and fever. Read and follow all instructions on the label. Do not give aspirin to anyone younger than 20. It has been linked to Reye syndrome, a serious illness. · Drink plenty of fluids. Hot fluids, such as tea or soup, may help relieve congestion in your nose and throat. If you have kidney, heart, or liver disease and have to limit fluids, talk with your doctor before you increase the amount of fluids you drink. · Try to clear mucus from your lungs by breathing deeply and coughing.   · Gargle with warm salt water once an hour. This can help reduce swelling and throat pain. Use 1 teaspoon of salt mixed in 1 cup of warm water. · Do not smoke or allow others to smoke around you. If you need help quitting, talk to your doctor about stop-smoking programs and medicines. These can increase your chances of quitting for good. To avoid spreading the virus  · Cough or sneeze into a tissue. Then throw the tissue away. · If you don't have a tissue, use your hand to cover your cough or sneeze. Then clean your hand. You can also cough into your sleeve. · Wash your hands often. Use soap and warm water. Wash for 15 to 20 seconds each time. · If you don't have soap and water near you, you can clean your hands with alcohol wipes or gel. When should you call for help? Call your doctor now or seek immediate medical care if:    · You have a new or higher fever.     · Your fever lasts more than 48 hours.     · You have trouble breathing.     · You have a fever with a stiff neck or a severe headache.     · You are sensitive to light.     · You feel very sleepy or confused. Watch closely for changes in your health, and be sure to contact your doctor if:    · You do not get better as expected. Where can you learn more? Go to http://www.gray.com/  Enter Q795 in the search box to learn more about \"Viral Respiratory Infection: Care Instructions. \"  Current as of: July 6, 2021               Content Version: 13.0  © 2135-0836 [employer]. Care instructions adapted under license by [employer] (which disclaims liability or warranty for this information). If you have questions about a medical condition or this instruction, always ask your healthcare professional. [place] [address] any warranty or liability for your use of this information.

## 2021-11-16 NOTE — PROGRESS NOTES
Kiarra Mc is a 37 y.o. female who was seen by synchronous (real-time) audio-video technology on 11/16/2021 for URI        Assessment & Plan:   Diagnoses and all orders for this visit:    1. Acute non-recurrent maxillary sinusitis    Other orders  -     azithromycin (ZITHROMAX) 250 mg tablet; Take 2 tablets today, then take 1 tablet daily        Adults: For nasal congestion, cough and cold/flu symptoms I advised:    - Seek medical care if symptoms become more severe or if you develop      chest pain, shortness of breath, confusion.    - Contact us if your symptoms fail to improve after 7-10 days   - Rest as much as possible and stay home from work/school at least 24 hours                  after last fever              - Wash hand frequently and cough/sneeze into your sleeve to help prevent       infection of others   - Drink plenty of fluids   - Ibuprofen (Advil, Motrin) 400-800mg every 6 hours or                  Aleve 220 mg 1-2 pills every 8 hours for fever, headache, pain   - Tylenol extra strength 500 mg every 6 hours for pain, headache, fever   - Nasal saline rinses 2-3 times daily for nasal congestion   - Mucinex 1200 mg twice daily or Guaifenesin 400 mg every 4 hours for chest       congestion              - Robitussin DM or Delsym for cough(suppress cough and thin mucus.  )   - Cepacol throat lozenges and saline gargles (1 tsp salt in 8 oz water) for sore       throat   - Tea with honey for cough (buckwheat honey preferred)              - Benadryl (diphenhydramine) 50 mg at night for nasal congestion/allergies   - Pseudoephedrine 12-hour tablets twice daily for nasal and inner ear       -Ask your pharmacist (this is kept behind the counter)     -If you have high blood pressure or heart disease, use this        medication with caution (ask your doctor), alternative coricidin   - Afrin (oxymetazoline) nasal spray 2 sprays in each nostril twice daily for severe      congestion.       -Do not use this medication for more than 3 days as it may cause         \"rebound congestion\". -If you have high blood pressure or heart disease, use this medication       with caution (ask your doctor)      Children:   Sit in bathroom with hot shower running for steam.    Nasal saline rinses and Neti pot  In general for viral respiratory infection -  Aim for drainage/increased airflow  Increase fluids - Pedialyte popsicles, Gatorade mixed with 50% water         Subjective:   congestion and productive cough for 2 weeks. No fever. no nausea and no vomiting . No pain while swallowing. Over-the-counter remedies including none   . Hx Asthma:  no  Smoker:  no  Contacts with similar infections: yes   Recent travel:no       Prior to Admission medications    Medication Sig Start Date End Date Taking? Authorizing Provider   guaiFENesin-codeine (ROBITUSSIN AC) 100-10 mg/5 mL solution Take 5 mL by mouth three (3) times daily as needed for Cough. Max Daily Amount: 15 mL. 12/13/17   Fracisco Sanchez MD   guaiFENesin-codeine [facility]) 100-10 mg/5 mL solution Take 5 mL by mouth three (3) times daily as needed for Cough. Max Daily Amount: 15 mL. 6/6/17   Fracisco Sanchez MD   desogestrel-ethinyl estradiol (EMOQUETTE) 0.15-0.03 mg tab Take 1 Tab by mouth daily. Provider, Historical   Cetirizine (ZYRTEC) 10 mg cap Take  by mouth. Provider, Historical     There are no problems to display for this patient. Current Outpatient Medications   Medication Sig Dispense Refill    azithromycin (ZITHROMAX) 250 mg tablet Take 2 tablets today, then take 1 tablet daily 6 Tablet 0    guaiFENesin-codeine (ROBITUSSIN AC) 100-10 mg/5 mL solution Take 5 mL by mouth three (3) times daily as needed for Cough. Max Daily Amount: 15 mL. 180 mL 0    guaiFENesin-codeine (ROBITUSSIN AC) 100-10 mg/5 mL solution Take 5 mL by mouth three (3) times daily as needed for Cough. Max Daily Amount: 15 mL.  180 mL 0    desogestrel-ethinyl estradiol (EMOQUETTE) 0. 15-0.03 mg tab Take 1 Tab by mouth daily.  Cetirizine (ZYRTEC) 10 mg cap Take  by mouth. No Known Allergies  No past medical history on file. No past surgical history on file. No family history on file. Social History     Tobacco Use    Smoking status: Never Smoker    Smokeless tobacco: Never Used   Substance Use Topics    Alcohol use: Not on file       ROS    Objective:   No flowsheet data found. [INSTRUCTIONS:  \"[x]\" Indicates a positive item  \"[]\" Indicates a negative item  -- DELETE ALL ITEMS NOT EXAMINED]    Constitutional: [x] Appears well-developed and well-nourished [x] No apparent distress      [] Abnormal -     Mental status: [x] Alert and awake  [x] Oriented to person/place/time [x] Able to follow commands    [] Abnormal -     Eyes:   EOM    [x]  Normal    [] Abnormal -   Sclera  [x]  Normal    [] Abnormal -          Discharge [x]  None visible   [] Abnormal -     HENT: [x] Normocephalic, atraumatic  [] Abnormal -   [x] Mouth/Throat: Mucous membranes are moist    External Ears [x] Normal  [] Abnormal -    Neck: [x] No visualized mass [] Abnormal -     Pulmonary/Chest: [x] Respiratory effort normal   [x] No visualized signs of difficulty breathing or respiratory distress        [] Abnormal -      Musculoskeletal:   [x] Normal gait with no signs of ataxia         [x] Normal range of motion of neck        [] Abnormal -     Neurological:        [x] No Facial Asymmetry (Cranial nerve 7 motor function) (limited exam due to video visit)          [x] No gaze palsy        [] Abnormal -          Skin:        [x] No significant exanthematous lesions or discoloration noted on facial skin         [] Abnormal -            Psychiatric:       [x] Normal Affect [] Abnormal -        [x] No Hallucinations    Other pertinent observable physical exam findings:-        We discussed the expected course, resolution and complications of the diagnosis(es) in detail.   Medication risks, benefits, costs, interactions, and alternatives were discussed as indicated. I advised her to contact the office if her condition worsens, changes or fails to improve as anticipated. She expressed understanding with the diagnosis(es) and plan. Arlene Libman, was evaluated through a synchronous (real-time) audio-video encounter. The patient (or guardian if applicable) is aware that this is a billable service. Verbal consent to proceed has been obtained within the past 12 months. The visit was conducted pursuant to the emergency declaration under the [address] [place] [address], [address] [place] [address] authority and the [employer] Act. Patient identification was verified, and a caregiver was present when appropriate. The patient was located in a state where the provider was credentialed to provide care.       Orestes Cohen MD

## 2021-12-14 ENCOUNTER — OFFICE VISIT (OUTPATIENT)
Dept: INTERNAL MEDICINE CLINIC | Age: 41
End: 2021-12-14
Payer: COMMERCIAL

## 2021-12-14 VITALS
TEMPERATURE: 98.3 F | SYSTOLIC BLOOD PRESSURE: 137 MMHG | DIASTOLIC BLOOD PRESSURE: 84 MMHG | BODY MASS INDEX: 27.31 KG/M2 | OXYGEN SATURATION: 98 % | WEIGHT: 160 LBS | RESPIRATION RATE: 16 BRPM | HEART RATE: 91 BPM | HEIGHT: 64 IN

## 2021-12-14 DIAGNOSIS — E55.9 HYPOVITAMINOSIS D: ICD-10-CM

## 2021-12-14 DIAGNOSIS — Z00.00 WELL WOMAN EXAM (NO GYNECOLOGICAL EXAM): Primary | ICD-10-CM

## 2021-12-14 PROCEDURE — 99396 PREV VISIT EST AGE 40-64: CPT | Performed by: FAMILY MEDICINE

## 2021-12-14 NOTE — PROGRESS NOTES
Chief Complaint   Patient presents with    Complete Physical     Patient is here for a wellness visit     she is a 37y.o. year old female who presents for CPE  Complete Physical Exam Questions:    LMP -  12/2/2021  Last Pap (q 3 years, or q5 with HPV) - 07/2021  Last Mammogram (50-74 biennially)- 7/2021  Hx of abnl Pap - Yes    1. Do you follow a low fat diet? yes  2. Are you up to date on your Tdap (<10 years)? Yes  3. Have you ever had a Pneumovax vaccine (>65)? No   PCV13 No   PPSV23 No  4. Have you had Zoster vaccine (>60)? No  5. Have you had the HPV - Gardasil (13- 26)? Yes  6. Do you follow an exercise program?  Yesd  7. Do you smoke?  no  8. Do you consider yourself overweight?  no  9. Is there a family history of CAD< age 48? No  10. Is there a family history of Cancer? Yes  11. Do you know your Cancer risks? Yes  12. Have you had a colonoscopy?  no  13. Have you been tested for HIV or other STI's? Yes HIV testing today(18-66 y/o)? No  14. Have had a bone density scan or DEXA done(>65)? No  15. Have you had an EKG performed in the last five years (>50)? No    Other complaints:    Reviewed and agree with Nurse Note and duplicated in this note. Reviewed PmHx, RxHx, FmHx, SocHx, AllgHx and updated and dated in the chart. History reviewed. No pertinent family history. History reviewed. No pertinent past medical history.    Social History     Socioeconomic History    Marital status:    Tobacco Use    Smoking status: Never Smoker    Smokeless tobacco: Never Used        Review of Systems - negative except as listed above      Objective:     Vitals:    12/14/21 1615   BP: 137/84   Pulse: 91   Resp: 16   Temp: 98.3 °F (36.8 °C)   SpO2: 98%   Weight: 160 lb (72.6 kg)   Height: 5' 4\" (1.626 m)       Physical Examination: General appearance - alert, well appearing, and in no distress  Eyes - pupils equal and reactive, extraocular eye movements intact  Ears - bilateral TM's and external ear canals normal  Nose - normal and patent, no erythema, discharge or polyps  Mouth - mucous membranes moist, pharynx normal without lesions  Neck - supple, no significant adenopathy  Chest - clear to auscultation, no wheezes, rales or rhonchi, symmetric air entry  Heart - normal rate, regular rhythm, normal S1, S2, no murmurs, rubs, clicks or gallops  Abdomen - soft, nontender, nondistended, no masses or organomegaly  Neurological - alert, oriented, normal speech, no focal findings or movement disorder noted  Musculoskeletal - no joint tenderness, deformity or swelling  Extremities - peripheral pulses normal, no pedal edema, no clubbing or cyanosis  Skin - normal coloration and turgor, no rashes, no suspicious skin lesions noted      Assessment/ Plan:   Diagnoses and all orders for this visit:    1. Well woman exam (no gynecological exam)  -     CBC W/O DIFF; Future  -     LIPID PANEL; Future  -     METABOLIC PANEL, COMPREHENSIVE; Future  -     HEPATITIS C AB; Future           Labs to be drawn: CBC, CMP, Lipid            I have discussed the diagnosis with the patient and the intended plan as seen in the above orders. The patient has received an after-visit summary and questions were answered concerning future plans. Medication Side Effects and Warnings were discussed with patient,  Patient Labs were reviewed and or requested, and  Patient Past Records were reviewed and or requested  yes         Pt agrees to call or return to clinic and/or go to closest ER with any worsening of symptoms. This may include, but not limited to increased fever (>100.4) with NSAIDS or Tylenol, increased edema, confusion, rash, worsening of presenting symptoms. Please note that this dictation was completed with [employer], the [employer] voice recognition software. Quite often unanticipated grammatical, syntax, homophones, and other interpretive errors are inadvertently transcribed by the computer software.   Please disregard these errors. Please excuse any errors that have escaped final proofreading. Thank you.

## 2021-12-15 LAB
25(OH)D3 SERPL-MCNC: 26.1 NG/ML (ref 30–100)
ALBUMIN SERPL-MCNC: 3.7 G/DL (ref 3.5–5)
ALBUMIN/GLOB SERPL: 1.1 {RATIO} (ref 1.1–2.2)
ALP SERPL-CCNC: 51 U/L (ref 45–117)
ALT SERPL-CCNC: 21 U/L (ref 12–78)
ANION GAP SERPL CALC-SCNC: 6 MMOL/L (ref 5–15)
AST SERPL-CCNC: 12 U/L (ref 15–37)
BILIRUB SERPL-MCNC: 0.4 MG/DL (ref 0.2–1)
BUN SERPL-MCNC: 11 MG/DL (ref 6–20)
BUN/CREAT SERPL: 14 (ref 12–20)
CALCIUM SERPL-MCNC: 9.1 MG/DL (ref 8.5–10.1)
CHLORIDE SERPL-SCNC: 106 MMOL/L (ref 97–108)
CHOLEST SERPL-MCNC: 151 MG/DL
CO2 SERPL-SCNC: 26 MMOL/L (ref 21–32)
CREAT SERPL-MCNC: 0.77 MG/DL (ref 0.55–1.02)
ERYTHROCYTE [DISTWIDTH] IN BLOOD BY AUTOMATED COUNT: 12.2 % (ref 11.5–14.5)
GLOBULIN SER CALC-MCNC: 3.3 G/DL (ref 2–4)
GLUCOSE SERPL-MCNC: 90 MG/DL (ref 65–100)
HCT VFR BLD AUTO: 40.1 % (ref 35–47)
HCV AB SERPL QL IA: NONREACTIVE
HDLC SERPL-MCNC: 49 MG/DL
HDLC SERPL: 3.1 {RATIO} (ref 0–5)
HGB BLD-MCNC: 13 G/DL (ref 11.5–16)
LDLC SERPL CALC-MCNC: 79.2 MG/DL (ref 0–100)
MCH RBC QN AUTO: 31.1 PG (ref 26–34)
MCHC RBC AUTO-ENTMCNC: 32.4 G/DL (ref 30–36.5)
MCV RBC AUTO: 95.9 FL (ref 80–99)
NRBC # BLD: 0 K/UL (ref 0–0.01)
NRBC BLD-RTO: 0 PER 100 WBC
PLATELET # BLD AUTO: 251 K/UL (ref 150–400)
PMV BLD AUTO: 10.8 FL (ref 8.9–12.9)
POTASSIUM SERPL-SCNC: 4.2 MMOL/L (ref 3.5–5.1)
PROT SERPL-MCNC: 7 G/DL (ref 6.4–8.2)
RBC # BLD AUTO: 4.18 M/UL (ref 3.8–5.2)
SODIUM SERPL-SCNC: 138 MMOL/L (ref 136–145)
TRIGL SERPL-MCNC: 114 MG/DL (ref ?–150)
TSH SERPL DL<=0.05 MIU/L-ACNC: 1.75 UIU/ML (ref 0.36–3.74)
VLDLC SERPL CALC-MCNC: 22.8 MG/DL
WBC # BLD AUTO: 6.8 K/UL (ref 3.6–11)

## 2021-12-15 NOTE — PROGRESS NOTES
Your results show low vitamin d level. Take over the counter vit d supplements 1000 International Units (IU's) daily. Come in to recheck this lab in 3-6 months.

## 2024-01-18 ENCOUNTER — OFFICE VISIT (OUTPATIENT)
Facility: CLINIC | Age: 44
End: 2024-01-18
Payer: COMMERCIAL

## 2024-01-18 VITALS
BODY MASS INDEX: 25.73 KG/M2 | RESPIRATION RATE: 16 BRPM | OXYGEN SATURATION: 98 % | TEMPERATURE: 98 F | HEIGHT: 64 IN | HEART RATE: 71 BPM | SYSTOLIC BLOOD PRESSURE: 115 MMHG | DIASTOLIC BLOOD PRESSURE: 81 MMHG | WEIGHT: 150.7 LBS

## 2024-01-18 DIAGNOSIS — G43.909 MIGRAINE WITHOUT STATUS MIGRAINOSUS, NOT INTRACTABLE, UNSPECIFIED MIGRAINE TYPE: ICD-10-CM

## 2024-01-18 DIAGNOSIS — Z12.31 ENCOUNTER FOR SCREENING MAMMOGRAM FOR MALIGNANT NEOPLASM OF BREAST: ICD-10-CM

## 2024-01-18 DIAGNOSIS — Z00.00 WELL WOMAN EXAM (NO GYNECOLOGICAL EXAM): Primary | ICD-10-CM

## 2024-01-18 LAB
ALBUMIN SERPL-MCNC: 3.5 G/DL (ref 3.5–5)
ALBUMIN/GLOB SERPL: 1.1 (ref 1.1–2.2)
ALP SERPL-CCNC: 44 U/L (ref 45–117)
ALT SERPL-CCNC: 18 U/L (ref 12–78)
ANION GAP SERPL CALC-SCNC: 3 MMOL/L (ref 5–15)
AST SERPL-CCNC: 12 U/L (ref 15–37)
BASOPHILS # BLD: 0 K/UL (ref 0–0.1)
BASOPHILS NFR BLD: 1 % (ref 0–1)
BILIRUB SERPL-MCNC: 0.5 MG/DL (ref 0.2–1)
BUN SERPL-MCNC: 9 MG/DL (ref 6–20)
BUN/CREAT SERPL: 11 (ref 12–20)
CALCIUM SERPL-MCNC: 8.3 MG/DL (ref 8.5–10.1)
CHLORIDE SERPL-SCNC: 110 MMOL/L (ref 97–108)
CHOLEST SERPL-MCNC: 153 MG/DL
CO2 SERPL-SCNC: 27 MMOL/L (ref 21–32)
CREAT SERPL-MCNC: 0.81 MG/DL (ref 0.55–1.02)
DIFFERENTIAL METHOD BLD: ABNORMAL
EOSINOPHIL # BLD: 0.1 K/UL (ref 0–0.4)
EOSINOPHIL NFR BLD: 2 % (ref 0–7)
ERYTHROCYTE [DISTWIDTH] IN BLOOD BY AUTOMATED COUNT: 12.7 % (ref 11.5–14.5)
GLOBULIN SER CALC-MCNC: 3.3 G/DL (ref 2–4)
GLUCOSE SERPL-MCNC: 91 MG/DL (ref 65–100)
HCT VFR BLD AUTO: 39.6 % (ref 35–47)
HDLC SERPL-MCNC: 60 MG/DL
HDLC SERPL: 2.6 (ref 0–5)
HGB BLD-MCNC: 13.5 G/DL (ref 11.5–16)
IMM GRANULOCYTES # BLD AUTO: 0 K/UL (ref 0–0.04)
IMM GRANULOCYTES NFR BLD AUTO: 0 % (ref 0–0.5)
LDLC SERPL CALC-MCNC: 76.4 MG/DL (ref 0–100)
LYMPHOCYTES # BLD: 1.6 K/UL (ref 0.8–3.5)
LYMPHOCYTES NFR BLD: 28 % (ref 12–49)
MCH RBC QN AUTO: 31.5 PG (ref 26–34)
MCHC RBC AUTO-ENTMCNC: 34.1 G/DL (ref 30–36.5)
MCV RBC AUTO: 92.3 FL (ref 80–99)
MONOCYTES # BLD: 0.2 K/UL (ref 0–1)
MONOCYTES NFR BLD: 4 % (ref 5–13)
NEUTS SEG # BLD: 3.7 K/UL (ref 1.8–8)
NEUTS SEG NFR BLD: 65 % (ref 32–75)
NRBC # BLD: 0 K/UL (ref 0–0.01)
NRBC BLD-RTO: 0 PER 100 WBC
PLATELET # BLD AUTO: 234 K/UL (ref 150–400)
PMV BLD AUTO: 10.9 FL (ref 8.9–12.9)
POTASSIUM SERPL-SCNC: 4.7 MMOL/L (ref 3.5–5.1)
PROT SERPL-MCNC: 6.8 G/DL (ref 6.4–8.2)
RBC # BLD AUTO: 4.29 M/UL (ref 3.8–5.2)
SODIUM SERPL-SCNC: 140 MMOL/L (ref 136–145)
TRIGL SERPL-MCNC: 83 MG/DL
VLDLC SERPL CALC-MCNC: 16.6 MG/DL
WBC # BLD AUTO: 5.7 K/UL (ref 3.6–11)

## 2024-01-18 PROCEDURE — 99396 PREV VISIT EST AGE 40-64: CPT | Performed by: FAMILY MEDICINE

## 2024-01-18 PROCEDURE — G8484 FLU IMMUNIZE NO ADMIN: HCPCS | Performed by: FAMILY MEDICINE

## 2024-01-18 RX ORDER — OMEGA-3/DHA/EPA/FISH OIL 60 MG-90MG
500 CAPSULE ORAL DAILY
COMMUNITY

## 2024-01-18 RX ORDER — RIMEGEPANT SULFATE 75 MG/75MG
1 TABLET, ORALLY DISINTEGRATING ORAL ONCE
Qty: 10 TABLET | Refills: 0 | Status: SHIPPED | OUTPATIENT
Start: 2024-01-18 | End: 2024-01-18

## 2024-01-18 SDOH — ECONOMIC STABILITY: INCOME INSECURITY: HOW HARD IS IT FOR YOU TO PAY FOR THE VERY BASICS LIKE FOOD, HOUSING, MEDICAL CARE, AND HEATING?: NOT HARD AT ALL

## 2024-01-18 SDOH — ECONOMIC STABILITY: FOOD INSECURITY: WITHIN THE PAST 12 MONTHS, YOU WORRIED THAT YOUR FOOD WOULD RUN OUT BEFORE YOU GOT MONEY TO BUY MORE.: NEVER TRUE

## 2024-01-18 SDOH — ECONOMIC STABILITY: HOUSING INSECURITY
IN THE LAST 12 MONTHS, WAS THERE A TIME WHEN YOU DID NOT HAVE A STEADY PLACE TO SLEEP OR SLEPT IN A SHELTER (INCLUDING NOW)?: NO

## 2024-01-18 SDOH — ECONOMIC STABILITY: FOOD INSECURITY: WITHIN THE PAST 12 MONTHS, THE FOOD YOU BOUGHT JUST DIDN'T LAST AND YOU DIDN'T HAVE MONEY TO GET MORE.: NEVER TRUE

## 2024-01-18 ASSESSMENT — ANXIETY QUESTIONNAIRES
7. FEELING AFRAID AS IF SOMETHING AWFUL MIGHT HAPPEN: 0
5. BEING SO RESTLESS THAT IT IS HARD TO SIT STILL: 0
6. BECOMING EASILY ANNOYED OR IRRITABLE: 0
1. FEELING NERVOUS, ANXIOUS, OR ON EDGE: 0
IF YOU CHECKED OFF ANY PROBLEMS ON THIS QUESTIONNAIRE, HOW DIFFICULT HAVE THESE PROBLEMS MADE IT FOR YOU TO DO YOUR WORK, TAKE CARE OF THINGS AT HOME, OR GET ALONG WITH OTHER PEOPLE: NOT DIFFICULT AT ALL
3. WORRYING TOO MUCH ABOUT DIFFERENT THINGS: 0
2. NOT BEING ABLE TO STOP OR CONTROL WORRYING: 0
4. TROUBLE RELAXING: 0
GAD7 TOTAL SCORE: 0

## 2024-01-18 ASSESSMENT — PATIENT HEALTH QUESTIONNAIRE - PHQ9
2. FEELING DOWN, DEPRESSED OR HOPELESS: 0
SUM OF ALL RESPONSES TO PHQ QUESTIONS 1-9: 0
SUM OF ALL RESPONSES TO PHQ9 QUESTIONS 1 & 2: 0
1. LITTLE INTEREST OR PLEASURE IN DOING THINGS: 0
SUM OF ALL RESPONSES TO PHQ QUESTIONS 1-9: 0

## 2024-12-05 ENCOUNTER — TELEMEDICINE (OUTPATIENT)
Facility: CLINIC | Age: 44
End: 2024-12-05
Payer: COMMERCIAL

## 2024-12-05 DIAGNOSIS — J06.9 UPPER RESPIRATORY TRACT INFECTION, UNSPECIFIED TYPE: Primary | ICD-10-CM

## 2024-12-05 PROCEDURE — 99214 OFFICE O/P EST MOD 30 MIN: CPT | Performed by: FAMILY MEDICINE

## 2024-12-05 RX ORDER — AZITHROMYCIN 250 MG/1
TABLET, FILM COATED ORAL
Qty: 6 TABLET | Refills: 0 | Status: SHIPPED | OUTPATIENT
Start: 2024-12-05 | End: 2024-12-15

## 2025-05-13 LAB
CHOLEST SERPL-MCNC: 162 MG/DL
GLUCOSE SERPL-MCNC: 98 MG/DL (ref 65–100)
HDLC SERPL-MCNC: 63 MG/DL
LDLC SERPL CALC-MCNC: 89 MG/DL (ref 0–100)
TRIGL SERPL-MCNC: 50 MG/DL